# Patient Record
Sex: FEMALE | Race: WHITE | Employment: FULL TIME | URBAN - METROPOLITAN AREA
[De-identification: names, ages, dates, MRNs, and addresses within clinical notes are randomized per-mention and may not be internally consistent; named-entity substitution may affect disease eponyms.]

---

## 2017-02-03 ENCOUNTER — OFFICE VISIT (OUTPATIENT)
Dept: FAMILY MEDICINE CLINIC | Age: 37
End: 2017-02-03

## 2017-02-03 ENCOUNTER — HOSPITAL ENCOUNTER (OUTPATIENT)
Dept: GENERAL RADIOLOGY | Age: 37
Discharge: HOME | End: 2017-02-03
Attending: FAMILY MEDICINE | Admitting: FAMILY MEDICINE

## 2017-02-03 VITALS
DIASTOLIC BLOOD PRESSURE: 102 MMHG | OXYGEN SATURATION: 98 % | SYSTOLIC BLOOD PRESSURE: 148 MMHG | BODY MASS INDEX: 43.5 KG/M2 | WEIGHT: 261.4 LBS | TEMPERATURE: 96.8 F | HEART RATE: 97 BPM

## 2017-02-03 DIAGNOSIS — R10.84 GENERALIZED ABDOMINAL PAIN: ICD-10-CM

## 2017-02-03 DIAGNOSIS — K52.9 GASTROENTERITIS: Primary | ICD-10-CM

## 2017-02-03 DIAGNOSIS — E03.4 HYPOTHYROIDISM DUE TO ACQUIRED ATROPHY OF THYROID: ICD-10-CM

## 2017-02-03 DIAGNOSIS — E28.2 PCOS (POLYCYSTIC OVARIAN SYNDROME): ICD-10-CM

## 2017-02-03 LAB
A/G RATIO: 1.3 (ref 1.1–2.2)
ALBUMIN SERPL-MCNC: 4.1 G/DL (ref 3.4–5)
ALP BLD-CCNC: 86 U/L (ref 40–129)
ALT SERPL-CCNC: 44 U/L (ref 10–40)
ANION GAP SERPL CALCULATED.3IONS-SCNC: 17 MMOL/L (ref 3–16)
AST SERPL-CCNC: 33 U/L (ref 15–37)
BILIRUB SERPL-MCNC: 0.4 MG/DL (ref 0–1)
BUN BLDV-MCNC: 8 MG/DL (ref 7–20)
CALCIUM SERPL-MCNC: 9.1 MG/DL (ref 8.3–10.6)
CHLORIDE BLD-SCNC: 101 MMOL/L (ref 99–110)
CO2: 21 MMOL/L (ref 21–32)
CREAT SERPL-MCNC: 0.6 MG/DL (ref 0.6–1.1)
GFR AFRICAN AMERICAN: >60
GFR NON-AFRICAN AMERICAN: >60
GLOBULIN: 3.1 G/DL
GLUCOSE BLD-MCNC: 127 MG/DL (ref 70–99)
HCT VFR BLD CALC: 46.9 % (ref 36–48)
HEMOGLOBIN: 15.3 G/DL (ref 12–16)
LIPASE: 37 U/L (ref 13–60)
MCH RBC QN AUTO: 29 PG (ref 26–34)
MCHC RBC AUTO-ENTMCNC: 32.7 G/DL (ref 31–36)
MCV RBC AUTO: 88.6 FL (ref 80–100)
PDW BLD-RTO: 13.9 % (ref 12.4–15.4)
PLATELET # BLD: 286 K/UL (ref 135–450)
PMV BLD AUTO: 9.7 FL (ref 5–10.5)
POTASSIUM SERPL-SCNC: 3.8 MMOL/L (ref 3.5–5.1)
RBC # BLD: 5.29 M/UL (ref 4–5.2)
SODIUM BLD-SCNC: 139 MMOL/L (ref 136–145)
TOTAL PROTEIN: 7.2 G/DL (ref 6.4–8.2)
TSH SERPL DL<=0.05 MIU/L-ACNC: 3.45 UIU/ML (ref 0.27–4.2)
WBC # BLD: 10.2 K/UL (ref 4–11)

## 2017-02-03 PROCEDURE — 99214 OFFICE O/P EST MOD 30 MIN: CPT | Performed by: FAMILY MEDICINE

## 2017-02-03 PROCEDURE — 36415 COLL VENOUS BLD VENIPUNCTURE: CPT | Performed by: FAMILY MEDICINE

## 2017-02-03 RX ORDER — LEVOTHYROXINE SODIUM 112 UG/1
TABLET ORAL
COMMUNITY
Start: 2017-01-28 | End: 2017-09-13 | Stop reason: RX

## 2017-02-03 RX ORDER — PROMETHAZINE HYDROCHLORIDE 25 MG/1
25 TABLET ORAL EVERY 6 HOURS PRN
Qty: 30 TABLET | Refills: 0 | Status: SHIPPED | OUTPATIENT
Start: 2017-02-03 | End: 2017-09-13 | Stop reason: ALTCHOICE

## 2017-02-03 RX ORDER — PIOGLITAZONEHYDROCHLORIDE 30 MG/1
30 TABLET ORAL DAILY
Qty: 30 TABLET | Refills: 5 | Status: SHIPPED | OUTPATIENT
Start: 2017-02-03 | End: 2017-09-13 | Stop reason: RX

## 2017-02-03 RX ORDER — CIPROFLOXACIN 250 MG/1
250 TABLET, FILM COATED ORAL 2 TIMES DAILY
Qty: 10 TABLET | Refills: 0 | Status: SHIPPED | OUTPATIENT
Start: 2017-02-03 | End: 2017-02-08

## 2017-09-13 ENCOUNTER — OFFICE VISIT (OUTPATIENT)
Dept: FAMILY MEDICINE CLINIC | Age: 37
End: 2017-09-13

## 2017-09-13 VITALS
SYSTOLIC BLOOD PRESSURE: 138 MMHG | DIASTOLIC BLOOD PRESSURE: 88 MMHG | WEIGHT: 271 LBS | HEART RATE: 82 BPM | TEMPERATURE: 98.2 F | OXYGEN SATURATION: 98 % | BODY MASS INDEX: 46.26 KG/M2 | HEIGHT: 64 IN

## 2017-09-13 DIAGNOSIS — M54.41 ACUTE BILATERAL LOW BACK PAIN WITH BILATERAL SCIATICA: Primary | ICD-10-CM

## 2017-09-13 DIAGNOSIS — M54.42 ACUTE BILATERAL LOW BACK PAIN WITH BILATERAL SCIATICA: Primary | ICD-10-CM

## 2017-09-13 DIAGNOSIS — J06.9 UPPER RESPIRATORY INFECTION, ACUTE: ICD-10-CM

## 2017-09-13 DIAGNOSIS — R06.2 WHEEZING: ICD-10-CM

## 2017-09-13 LAB
CONTROL: PRESENT
PREGNANCY TEST URINE, POC: NEGATIVE

## 2017-09-13 PROCEDURE — 81025 URINE PREGNANCY TEST: CPT | Performed by: FAMILY MEDICINE

## 2017-09-13 PROCEDURE — 99214 OFFICE O/P EST MOD 30 MIN: CPT | Performed by: FAMILY MEDICINE

## 2017-09-13 RX ORDER — ALBUTEROL SULFATE 90 UG/1
2 AEROSOL, METERED RESPIRATORY (INHALATION) EVERY 6 HOURS PRN
Qty: 1 INHALER | Refills: 3 | Status: SHIPPED | OUTPATIENT
Start: 2017-09-13 | End: 2019-04-25

## 2017-09-13 RX ORDER — PREDNISONE 20 MG/1
TABLET ORAL
Qty: 18 TABLET | Refills: 0 | Status: SHIPPED | OUTPATIENT
Start: 2017-09-13 | End: 2018-01-06

## 2017-09-13 RX ORDER — FLUTICASONE PROPIONATE 50 MCG
1 SPRAY, SUSPENSION (ML) NASAL DAILY
Qty: 1 BOTTLE | Refills: 3 | Status: SHIPPED | OUTPATIENT
Start: 2017-09-13 | End: 2019-04-25

## 2017-09-13 ASSESSMENT — PATIENT HEALTH QUESTIONNAIRE - PHQ9
1. LITTLE INTEREST OR PLEASURE IN DOING THINGS: 1
SUM OF ALL RESPONSES TO PHQ9 QUESTIONS 1 & 2: 2
2. FEELING DOWN, DEPRESSED OR HOPELESS: 1
SUM OF ALL RESPONSES TO PHQ QUESTIONS 1-9: 2

## 2017-09-15 ENCOUNTER — TELEPHONE (OUTPATIENT)
Dept: FAMILY MEDICINE CLINIC | Age: 37
End: 2017-09-15

## 2017-09-18 ENCOUNTER — OFFICE VISIT (OUTPATIENT)
Dept: FAMILY MEDICINE CLINIC | Age: 37
End: 2017-09-18

## 2017-09-18 VITALS
DIASTOLIC BLOOD PRESSURE: 90 MMHG | HEART RATE: 76 BPM | OXYGEN SATURATION: 98 % | WEIGHT: 277.2 LBS | SYSTOLIC BLOOD PRESSURE: 160 MMHG | TEMPERATURE: 97.2 F | BODY MASS INDEX: 47.58 KG/M2

## 2017-09-18 DIAGNOSIS — E03.4 HYPOTHYROIDISM DUE TO ACQUIRED ATROPHY OF THYROID: ICD-10-CM

## 2017-09-18 DIAGNOSIS — M54.42 ACUTE LEFT-SIDED LOW BACK PAIN WITH LEFT-SIDED SCIATICA: Primary | ICD-10-CM

## 2017-09-18 DIAGNOSIS — R03.0 ELEVATED BLOOD-PRESSURE READING WITHOUT DIAGNOSIS OF HYPERTENSION: ICD-10-CM

## 2017-09-18 DIAGNOSIS — F17.200 TOBACCO USE DISORDER: ICD-10-CM

## 2017-09-18 PROCEDURE — 99214 OFFICE O/P EST MOD 30 MIN: CPT | Performed by: FAMILY MEDICINE

## 2017-09-18 RX ORDER — LEVOTHYROXINE SODIUM 112 UG/1
112 TABLET ORAL DAILY
Qty: 30 TABLET | Refills: 5 | Status: SHIPPED | OUTPATIENT
Start: 2017-09-18 | End: 2019-04-25

## 2017-10-20 ENCOUNTER — OFFICE VISIT (OUTPATIENT)
Dept: FAMILY MEDICINE CLINIC | Age: 37
End: 2017-10-20

## 2017-10-20 VITALS
BODY MASS INDEX: 47.31 KG/M2 | DIASTOLIC BLOOD PRESSURE: 80 MMHG | WEIGHT: 277.12 LBS | SYSTOLIC BLOOD PRESSURE: 124 MMHG | HEIGHT: 64 IN | HEART RATE: 77 BPM | OXYGEN SATURATION: 98 %

## 2017-10-20 DIAGNOSIS — F17.200 TOBACCO USE DISORDER: ICD-10-CM

## 2017-10-20 DIAGNOSIS — K21.9 GASTROESOPHAGEAL REFLUX DISEASE WITHOUT ESOPHAGITIS: Primary | ICD-10-CM

## 2017-10-20 PROCEDURE — G8484 FLU IMMUNIZE NO ADMIN: HCPCS | Performed by: FAMILY MEDICINE

## 2017-10-20 PROCEDURE — 4004F PT TOBACCO SCREEN RCVD TLK: CPT | Performed by: FAMILY MEDICINE

## 2017-10-20 PROCEDURE — 99214 OFFICE O/P EST MOD 30 MIN: CPT | Performed by: FAMILY MEDICINE

## 2017-10-20 PROCEDURE — G8417 CALC BMI ABV UP PARAM F/U: HCPCS | Performed by: FAMILY MEDICINE

## 2017-10-20 PROCEDURE — G8427 DOCREV CUR MEDS BY ELIG CLIN: HCPCS | Performed by: FAMILY MEDICINE

## 2017-10-20 RX ORDER — OMEPRAZOLE 40 MG/1
40 CAPSULE, DELAYED RELEASE ORAL DAILY
Qty: 30 CAPSULE | Refills: 1 | Status: SHIPPED | OUTPATIENT
Start: 2017-10-20 | End: 2018-01-08 | Stop reason: SDUPTHER

## 2017-10-20 RX ORDER — VARENICLINE TARTRATE 25 MG
KIT ORAL
Qty: 1 EACH | Refills: 0 | Status: SHIPPED | OUTPATIENT
Start: 2017-10-20 | End: 2019-04-25

## 2017-10-20 ASSESSMENT — PATIENT HEALTH QUESTIONNAIRE - PHQ9
1. LITTLE INTEREST OR PLEASURE IN DOING THINGS: 0
SUM OF ALL RESPONSES TO PHQ9 QUESTIONS 1 & 2: 0
2. FEELING DOWN, DEPRESSED OR HOPELESS: 0
SUM OF ALL RESPONSES TO PHQ QUESTIONS 1-9: 0

## 2017-10-20 NOTE — PATIENT INSTRUCTIONS
Patient Education        Stopping Smoking: Care Instructions  Your Care Instructions  Cigarette smokers crave the nicotine in cigarettes. Giving it up is much harder than simply changing a habit. Your body has to stop craving the nicotine. It is hard to quit, but you can do it. There are many tools that people use to quit smoking. You may find that combining tools works best for you. There are several steps to quitting. First you get ready to quit. Then you get support to help you. After that, you learn new skills and behaviors to become a nonsmoker. For many people, a necessary step is getting and using medicine. Your doctor will help you set up the plan that best meets your needs. You may want to attend a smoking cessation program to help you quit smoking. When you choose a program, look for one that has proven success. Ask your doctor for ideas. You will greatly increase your chances of success if you take medicine as well as get counseling or join a cessation program.  Some of the changes you feel when you first quit tobacco are uncomfortable. Your body will miss the nicotine at first, and you may feel short-tempered and grumpy. You may have trouble sleeping or concentrating. Medicine can help you deal with these symptoms. You may struggle with changing your smoking habits and rituals. The last step is the tricky one: Be prepared for the smoking urge to continue for a time. This is a lot to deal with, but keep at it. You will feel better. Follow-up care is a key part of your treatment and safety. Be sure to make and go to all appointments, and call your doctor if you are having problems. Its also a good idea to know your test results and keep a list of the medicines you take. How can you care for yourself at home? · Ask your family, friends, and coworkers for support. You have a better chance of quitting if you have help and support.   · Join a support group, such as Nicotine Anonymous, for people who are trying to quit smoking. · Consider signing up for a smoking cessation program, such as the American Lung Association's Freedom from Smoking program.  · Set a quit date. Pick your date carefully so that it is not right in the middle of a big deadline or stressful time. Once you quit, do not even take a puff. Get rid of all ashtrays and lighters after your last cigarette. Clean your house and your clothes so that they do not smell of smoke. · Learn how to be a nonsmoker. Think about ways you can avoid those things that make you reach for a cigarette. ¨ Avoid situations that put you at greatest risk for smoking. For some people, it is hard to have a drink with friends without smoking. For others, they might skip a coffee break with coworkers who smoke. ¨ Change your daily routine. Take a different route to work or eat a meal in a different place. · Cut down on stress. Calm yourself or release tension by doing an activity you enjoy, such as reading a book, taking a hot bath, or gardening. · Talk to your doctor or pharmacist about nicotine replacement therapy, which replaces the nicotine in your body. You still get nicotine but you do not use tobacco. Nicotine replacement products help you slowly reduce the amount of nicotine you need. These products come in several forms, many of them available over-the-counter:  ¨ Nicotine patches  ¨ Nicotine gum and lozenges  ¨ Nicotine inhaler  · Ask your doctor about bupropion (Wellbutrin) or varenicline (Chantix), which are prescription medicines. They do not contain nicotine. They help you by reducing withdrawal symptoms, such as stress and anxiety. · Some people find hypnosis, acupuncture, and massage helpful for ending the smoking habit. · Eat a healthy diet and get regular exercise. Having healthy habits will help your body move past its craving for nicotine. · Be prepared to keep trying. Most people are not successful the first few times they try to quit.  Do not get mad at yourself if you smoke again. Make a list of things you learned and think about when you want to try again, such as next week, next month, or next year. Where can you learn more? Go to [email or web address]://[email or web address] Center[facility].[email or web address] org and sign in to your Wipster account. Enter M220 in the EDITD box to learn more about \"Stopping Smoking: Care Instructions. \"     If you do not have an account, please click on the \"Sign Up Now\" link. Current as of: March 20, 2017  Content Version: 11.3  © 3394-8099 [employer]. Care instructions adapted under license by [facility] ([facility]). If you have questions about a medical condition or this instruction, always ask your healthcare professional. [address] any warranty or liability for your use of this information.

## 2017-10-20 NOTE — PROGRESS NOTES
SUBJECTIVE:  Roxanne Shahid is a 30 y.o. female who complains of GERD type symptoms. She has been experiencing heartburn, bilious reflux, nocturnal burning, waterbrash for many day(s), acute over time. ROS: patient denies abdominal pain, dysphagia, hematemesis or diarrhea. Social history: no or minimal alcohol, smoking 1 PPD, no or mild caffeine use, no ASA or NSAID's. Patient wants to quit smoking. She smoking about 1 pack per day at this time. He was never anemic before but now she is ready to try to quit. ROS: No TIA's or unusual headaches, no dysphagia. No prolonged cough. No dyspnea or chest pain on exertion. No abdominal pain, change in bowel habits, black or bloody stools. No urinary tract symptoms. No new or unusual musculoskeletal symptoms. Normal menses, no abnormal vaginal bleeding, discharge or unexpected pelvic pain. No new breast lumps, breast pain or nipple discharge. Past Medical History:   Diagnosis Date    Facial injury 06/01/2014    Thyroid disease      Past Surgical History:   Procedure Laterality Date    CHOLECYSTECTOMY           Current Outpatient Prescriptions   Medication Sig Dispense Refill    omeprazole (PRILOSEC) 40 MG delayed release capsule Take 1 capsule by mouth daily 30 capsule 1    varenicline (CHANTIX STARTING MONTH KM) 0.5 MG X 11 & 1 MG X 42 tablet By mouth.  1 each 0    levothyroxine (SYNTHROID) 112 MCG tablet Take 1 tablet by mouth daily 30 tablet 5    Naproxen Sodium (ALEVE PO) Take by mouth      Naproxen Sod-Diphenhydramine (ALEVE PM PO) Take by mouth      albuterol sulfate HFA (VENTOLIN HFA) 108 (90 Base) MCG/ACT inhaler Inhale 2 puffs into the lungs every 6 hours as needed for Wheezing 1 Inhaler 3    predniSONE (DELTASONE) 20 MG tablet 3 tabs for 3 days 2 tabs for 3 days 1 tab for 3 days 18 tablet 0    fluticasone (FLONASE) 50 MCG/ACT nasal spray 1 spray by Nasal route daily 1 Bottle 3    ibuprofen (ADVIL;MOTRIN) 800 MG tablet Take 1 tablet by mouth every 8 hours as needed for Pain 24 tablet 0     No current facility-administered medications for this visit. OBJECTIVE:  Appears well, alert and oriented x 3, pleasant cooperative in NAD. Anicteric. Vitals as noted. Neck free of lymphadenopathy or mass. Abdomen - abdomen is soft without significant tenderness, masses, organomegaly or guarding. .    ASSESSMENT:  1. Gastroesophageal reflux disease without esophagitis  omeprazole (PRILOSEC) 40 MG delayed release capsule   2. Tobacco use disorder  varenicline (CHANTIX STARTING MONTH PAK) 0.5 MG X 11 & 1 MG X 42 tablet    [facility] Smoking Cessation Program         PLAN:  The pathophysiology of reflux is discussed. Anti-reflux measures such as raising the head of the bed, avoiding tight clothing or belts, avoiding eating late at night and not lying down shortly after mealtime and achieving weight loss are discussed. Avoid ASA, NSAID's, caffeine, peppermints, alcohol and tobacco. OTC H2 blockers and/or antacids are often very helpful for PRN use. However, for persisting chronic or daily symptoms, prescription strength H2 blockers or a trial of PPI's are often used. Further recommendations to her: Rx for PPI is written. She should alert me if there are persistent symptoms, dysphagia, weight loss or GI bleeding. Kim Sharma is scheduled.

## 2018-01-08 ENCOUNTER — OFFICE VISIT (OUTPATIENT)
Dept: GASTROENTEROLOGY | Age: 38
End: 2018-01-08

## 2018-01-08 ENCOUNTER — TELEPHONE (OUTPATIENT)
Dept: GASTROENTEROLOGY | Age: 38
End: 2018-01-08

## 2018-01-08 VITALS
BODY MASS INDEX: 45.15 KG/M2 | HEIGHT: 65 IN | SYSTOLIC BLOOD PRESSURE: 138 MMHG | WEIGHT: 271 LBS | OXYGEN SATURATION: 96 % | HEART RATE: 103 BPM | DIASTOLIC BLOOD PRESSURE: 90 MMHG

## 2018-01-08 DIAGNOSIS — R19.7 DIARRHEA, UNSPECIFIED TYPE: ICD-10-CM

## 2018-01-08 DIAGNOSIS — K21.9 GASTROESOPHAGEAL REFLUX DISEASE, ESOPHAGITIS PRESENCE NOT SPECIFIED: ICD-10-CM

## 2018-01-08 DIAGNOSIS — R11.0 NAUSEA: ICD-10-CM

## 2018-01-08 DIAGNOSIS — R93.89 ABNORMAL FINDING ON IMAGING: ICD-10-CM

## 2018-01-08 DIAGNOSIS — R10.31 RIGHT LOWER QUADRANT ABDOMINAL PAIN: Primary | ICD-10-CM

## 2018-01-08 DIAGNOSIS — K52.9 ENTERITIS: ICD-10-CM

## 2018-01-08 DIAGNOSIS — K62.5 RECTAL BLEEDING: ICD-10-CM

## 2018-01-08 PROCEDURE — G8484 FLU IMMUNIZE NO ADMIN: HCPCS | Performed by: NURSE PRACTITIONER

## 2018-01-08 PROCEDURE — G8417 CALC BMI ABV UP PARAM F/U: HCPCS | Performed by: NURSE PRACTITIONER

## 2018-01-08 PROCEDURE — 99204 OFFICE O/P NEW MOD 45 MIN: CPT | Performed by: NURSE PRACTITIONER

## 2018-01-08 PROCEDURE — 4004F PT TOBACCO SCREEN RCVD TLK: CPT | Performed by: NURSE PRACTITIONER

## 2018-01-08 PROCEDURE — G8599 NO ASA/ANTIPLAT THER USE RNG: HCPCS | Performed by: NURSE PRACTITIONER

## 2018-01-08 PROCEDURE — G8427 DOCREV CUR MEDS BY ELIG CLIN: HCPCS | Performed by: NURSE PRACTITIONER

## 2018-01-08 RX ORDER — OMEPRAZOLE 40 MG/1
40 CAPSULE, DELAYED RELEASE ORAL DAILY
Qty: 30 CAPSULE | Refills: 3 | Status: SHIPPED | OUTPATIENT
Start: 2018-01-08 | End: 2019-04-25

## 2018-01-08 ASSESSMENT — ENCOUNTER SYMPTOMS
EYE PAIN: 0
BLURRED VISION: 1
CONSTIPATION: 0
DIARRHEA: 1
NAUSEA: 1
COUGH: 1
ABDOMINAL PAIN: 1
BLOOD IN STOOL: 1
DOUBLE VISION: 0
HEARTBURN: 1
SHORTNESS OF BREATH: 1
SPUTUM PRODUCTION: 1
WHEEZING: 1
BACK PAIN: 1

## 2018-01-08 NOTE — PATIENT INSTRUCTIONS
get mad at yourself if you smoke again. Make a list of things you learned and think about when you want to try again, such as next week, next month, or next year. Where can you learn more? Go to https://kate.Racemi. org and sign in to your dotSyntax account. Enter C852 in the "[employer] box to learn more about \"Stopping Smoking: Care Instructions. \"     If you do not have an account, please click on the \"Sign Up Now\" link. Current as of: March 20, 2017  Content Version: 11.5  © 9203-2963 [employer]. Care instructions adapted under license by [facility] ([facility]). If you have questions about a medical condition or this instruction, always ask your healthcare professional. [address] any warranty or liability for your use of this information. Patient Education        Diarrhea: Care Instructions  Your Care Instructions    Diarrhea is loose, watery stools (bowel movements). The exact cause is often hard to find. Sometimes diarrhea is your body's way of getting rid of what caused an upset stomach. Viruses, food poisoning, and many medicines can cause diarrhea. Some people get diarrhea in response to emotional stress, anxiety, or certain foods. Almost everyone has diarrhea now and then. It usually isn't serious, and your stools will return to normal soon. The important thing to do is replace the fluids you have lost, so you can prevent dehydration. The doctor has checked you carefully, but problems can develop later. If you notice any problems or new symptoms, get medical treatment right away. Follow-up care is a key part of your treatment and safety. Be sure to make and go to all appointments, and call your doctor if you are having problems. It's also a good idea to know your test results and keep a list of the medicines you take. How can you care for yourself at home? · Watch for signs of dehydration, which means your body has lost too much water. Dehydration is a serious condition and should be treated right away. Signs of dehydration are:  ¨ Increasing thirst and dry eyes and mouth. ¨ Feeling faint or lightheaded. ¨ Darker urine, and a smaller amount of urine than normal.  · To prevent dehydration, drink plenty of fluids, enough so that your urine is light yellow or clear like water. Choose water and other caffeine-free clear liquids until you feel better. If you have kidney, heart, or liver disease and have to limit fluids, talk with your doctor before you increase the amount of fluids you drink. · Begin eating small amounts of mild foods the next day, if you feel like it. ¨ Try yogurt that has live cultures of Lactobacillus. (Check the label.)  ¨ Avoid spicy foods, fruits, alcohol, and caffeine until 48 hours after all symptoms are gone. ¨ Avoid chewing gum that contains sorbitol. ¨ Avoid dairy products (except for yogurt with Lactobacillus) while you have diarrhea and for 3 days after symptoms are gone. · The doctor may recommend that you take over-the-counter medicine, such as loperamide (Imodium), if you still have diarrhea after 6 hours. Read and follow all instructions on the label. Do not use this medicine if you have bloody diarrhea, a high fever, or other signs of serious illness. Call your doctor if you think you are having a problem with your medicine. When should you call for help? Call 911 anytime you think you may need emergency care. For example, call if:  ? · You passed out (lost consciousness). ? · Your stools are maroon or very bloody. ?Call your doctor now or seek immediate medical care if:  ? · You are dizzy or lightheaded, or you feel like you may faint. ? · Your stools are black and look like tar, or they have streaks of blood. ? · You have new or worse belly pain. ? · You have symptoms of dehydration, such as:  ¨ Dry eyes and a dry mouth. ¨ Passing only a little dark urine.   ¨ Feeling thirstier than usual.   ? · Understand exactly what procedure is planned, along with the risks, benefits, and other options. · Tell your doctors ALL the medicines, vitamins, supplements, and herbal remedies you take. Some of these can increase the risk of bleeding or interact with anesthesia. ? · If you take blood thinners, such as warfarin (Coumadin), clopidogrel (Plavix), or aspirin, be sure to talk to your doctor. He or she will tell you if you should stop taking these medicines before your procedure. Make sure that you understand exactly what your doctor wants you to do.   ? · Your doctor will tell you which medicines to take or stop before your procedure. You may need to stop taking certain medicines a week or more before the procedure. So talk to your doctor as soon as you can.   ? · If you have an advance directive, let your doctor know. It may include a living will and a durable power of  for health care. Bring a copy to the hospital. If you don't have one, you may want to prepare one. It lets your doctor and loved ones know your health care wishes. Doctors advise that everyone prepare these papers before any type of surgery or procedure. Procedures can be stressful. This information will help you understand what you can expect. And it will help you safely prepare for your procedure. What happens on the day of the procedure? · Follow the instructions exactly about when to stop eating and drinking. If you don't, your procedure may be canceled. If your doctor told you to take your medicines on the day of the procedure, take them with only a sip of water. ? · Take a bath or shower before you come in for your procedure. Do not apply lotions, perfumes, deodorants, or nail polish. ? · Take off all jewelry and piercings. And take out contact lenses, if you wear them. ? At the hospital or surgery center   · Bring a picture ID. ? · The test may take 15 to 30 minutes.    ? · The doctor may spray medicine on the back of your throat to numb it. You also will get medicine to prevent pain and to relax you. ? · You will lie on your left side. The doctor will put the scope in your mouth and toward the back of your throat. The doctor will tell you when to swallow. This helps the scope move down your throat. You will be able to breathe normally. The doctor will move the scope down your esophagus into your stomach. The doctor also may look at the duodenum. ? · If your doctor wants to take a sample of tissue for a biopsy, he or she may use small surgical tools, which are put into the scope, to cut off some tissue. You will not feel a biopsy, if one is taken. The doctor also can use the tools to stop bleeding or to do other treatments, if needed. ? · You will stay at the hospital or surgery center for 1 to 2 hours until the medicine you were given wears off. Going home   · Be sure you have someone to drive you home. Anesthesia and pain medicine make it unsafe for you to drive. ? · You will be given more specific instructions about recovering from your procedure. They will cover things like diet, wound care, follow-up care, driving, and getting back to your normal routine. When should you call your doctor? · You have questions or concerns. ? · You don't understand how to prepare for your procedure. ? · You become ill before the procedure (such as fever, flu, or a cold). ? · You need to reschedule or have changed your mind about having the procedure. Where can you learn more? Go to [email or web address] and sign in to your Siteheart account. Enter P790 in the [facility] box to learn more about \"Upper GI Endoscopy: Before Your Procedure. \"     If you do not have an account, please click on the \"Sign Up Now\" link. Current as of: May 12, 2017  Content Version: 11.5  © 3291-3705 Healthwise, Incorporated. Care instructions adapted under license by [facility] ([facility]).  If you have questions about a

## 2018-01-08 NOTE — PROGRESS NOTES
% 01/06/2018 0.2  0 - 1 % Final    Segs Absolute 01/06/2018 15.3  K/CU MM Final    Lymphocytes # 01/06/2018 3.3  K/CU MM Final    Monocytes # 01/06/2018 0.7  K/CU MM Final    Eosinophils # 01/06/2018 0.1  K/CU MM Final    Basophils # 01/06/2018 0.0  K/CU MM Final    Nucleated RBC % 01/06/2018 0.0  % Final    Total Nucleated RBC 01/06/2018 0.0  K/CU MM Final    TRC 01/06/2018 0.1235  K/CU MM Final    Total Immature Neutrophil 01/06/2018 0.07  K/CU MM Final    Immature Neutrophil % 01/06/2018 0.4  0 - 0.43 % Final    Sodium 01/06/2018 138  135 - 145 MMOL/L Final    Potassium 01/06/2018 4.5  3.5 - 5.1 MMOL/L Final    Chloride 01/06/2018 103  99 - 110 mMol/L Final    CO2 01/06/2018 20* 21 - 32 MMOL/L Final    BUN 01/06/2018 7  6 - 23 MG/DL Final    CREATININE 01/06/2018 0.7  0.6 - 1.1 MG/DL Final    Glucose 01/06/2018 112* 70 - 99 MG/DL Final    Calcium 01/06/2018 9.5  8.3 - 10.6 MG/DL Final    Alb 01/06/2018 4.1  3.4 - 5.0 GM/DL Final    Total Protein 01/06/2018 7.1  6.4 - 8.2 GM/DL Final    Total Bilirubin 01/06/2018 0.3  0.0 - 1.0 MG/DL Final    ALT 01/06/2018 29  10 - 40 U/L Final    AST 01/06/2018 22  15 - 37 IU/L Final    Alkaline Phosphatase 01/06/2018 85  40 - 129 IU/L Final    GFR Non- 01/06/2018 >60  >60 mL/min/1.73m2 Final    GFR  01/06/2018 >60  >60 mL/min/1.73m2 Final    Anion Gap 01/06/2018 15  4 - 16 Final    Protime 01/06/2018 10.5  9.12 - 12.5 SECONDS Final    INR 01/06/2018 0.92  INDEX Final    aPTT 01/06/2018 24.7  21.2 - 33.0 SECONDS Final    Troponin T 01/06/2018 <0.010  <0.01 NG/ML Final    Lipase 01/06/2018 24  13 - 60 IU/L Final    Color, UA 01/06/2018 YELLOW  UYELL Final    Clarity, UA 01/06/2018 HAZY* CLEAR Final    Glucose, Urine 01/06/2018 NEGATIVE  NEG MG/DL Final    Bilirubin Urine 01/06/2018 NEGATIVE  NEG MG/DL Final    Ketones, Urine 01/06/2018 NEGATIVE  NEG MG/DL Final    Specific Gravity, UA 01/06/2018 1.021  1.001 -

## 2018-01-09 ENCOUNTER — TELEPHONE (OUTPATIENT)
Dept: GASTROENTEROLOGY | Age: 38
End: 2018-01-09

## 2018-01-10 ENCOUNTER — TELEPHONE (OUTPATIENT)
Dept: GASTROENTEROLOGY | Age: 38
End: 2018-01-10

## 2018-01-10 RX ORDER — POLYETHYLENE GLYCOL 3350 17 G/17G
238 POWDER, FOR SOLUTION ORAL ONCE
Qty: 238 G | Refills: 0 | Status: SHIPPED | OUTPATIENT
Start: 2018-01-10 | End: 2018-01-10

## 2018-01-10 NOTE — TELEPHONE ENCOUNTER
Please notify patient that her Miralax and Biscadyl have been ordered for her colonoscopy preparation. Thank you.

## 2018-01-10 NOTE — TELEPHONE ENCOUNTER
Joaquin Gale from [employer] called to get clarification on rx sent for Bisacodyl. The instructions state take 1 tablet 1 time for colonoscopy prep. Instructions should say take 4 tablets 1 time for colonoscopy prep. I gave Joaquin Gale the new instructions. She expressed understanding and did not have any further questions.

## 2018-01-22 ENCOUNTER — HOSPITAL ENCOUNTER (OUTPATIENT)
Dept: OTHER | Age: 38
Discharge: HOME | End: 2018-01-22
Attending: NURSE PRACTITIONER | Admitting: NURSE PRACTITIONER

## 2018-01-23 LAB
CLOSTRIDIUM DIFFICILE, PCR: NORMAL
LACTOFERRIN, QUAL: NEGATIVE

## 2018-01-25 LAB
CALPROTECTIN, FECAL: <16
CULTURE: NORMAL
REPORT STATUS: NORMAL
REQUEST PROBLEM: NORMAL
SPECIMEN: NORMAL

## 2018-01-26 ENCOUNTER — TELEPHONE (OUTPATIENT)
Dept: GASTROENTEROLOGY | Age: 38
End: 2018-01-26

## 2018-01-26 LAB
OVA AND PARASITE WET MOUNT: NEGATIVE
TRICHROME SMEAR, STOOL O&P: NEGATIVE

## 2018-01-26 ASSESSMENT — LIFESTYLE VARIABLES: SMOKING_STATUS: 1

## 2018-01-26 NOTE — ANESTHESIA PRE-OP
Department of Anesthesiology  Preprocedure Note       Name:  Juancarlos Verdugo   Age:  30 y.o.  :  1980                                          MRN:  [id number]         Date:  2018      Surgeon:  Hollie Schultz    Procedure:  EGD and Colonoscopy    Medications prior to admission:   Prior to Admission medications    Medication Sig Start Date End Date Taking? Authorizing Provider   omeprazole (PRILOSEC) 40 MG delayed release capsule Take 1 capsule by mouth daily 18   Isidro Quarles CNP   ondansetron (ZOFRAN ODT) 4 MG disintegrating tablet Take 1 tablet by mouth every 8 hours as needed for Nausea 18   Jimbo Forbes PA-C   varenicline (CHANTIX STARTING MONTH KM) 0.5 MG X 11 & 1 MG X 42 tablet By mouth. 10/20/17   Sage Underwood MD   levothyroxine (SYNTHROID) 112 MCG tablet Take 1 tablet by mouth daily 17   Sage Underwood MD   Naproxen Sodium (ALEVE PO) Take by mouth    Historical Provider, MD   Naproxen Sod-Diphenhydramine (ALEVE PM PO) Take by mouth    Historical Provider, MD   albuterol sulfate HFA (VENTOLIN HFA) 108 (90 Base) MCG/ACT inhaler Inhale 2 puffs into the lungs every 6 hours as needed for Wheezing 17   Melquiades Randolph MD   fluticasone (FLONASE) 50 MCG/ACT nasal spray 1 spray by Nasal route daily 17   Melquiades Randolph MD   ibuprofen (ADVIL;MOTRIN) 800 MG tablet Take 1 tablet by mouth every 8 hours as needed for Pain 16   Callum Aleman MD       Current medications:    Current Outpatient Prescriptions   Medication Sig Dispense Refill    omeprazole (PRILOSEC) 40 MG delayed release capsule Take 1 capsule by mouth daily 30 capsule 3    ondansetron (ZOFRAN ODT) 4 MG disintegrating tablet Take 1 tablet by mouth every 8 hours as needed for Nausea 15 tablet 0    varenicline (CHANTIX STARTING MONTH KM) 0.5 MG X 11 & 1 MG X 42 tablet By mouth.  1 each 0    levothyroxine (SYNTHROID) 112 MCG tablet Take 1 tablet by mouth daily 30 tablet 5    Naproxen Sodium (ALEVE PO) Take by mouth      Naproxen Sod-Diphenhydramine (ALEVE PM PO) Take by mouth      albuterol sulfate HFA (VENTOLIN HFA) 108 (90 Base) MCG/ACT inhaler Inhale 2 puffs into the lungs every 6 hours as needed for Wheezing 1 Inhaler 3    fluticasone (FLONASE) 50 MCG/ACT nasal spray 1 spray by Nasal route daily 1 Bottle 3    ibuprofen (ADVIL;MOTRIN) 800 MG tablet Take 1 tablet by mouth every 8 hours as needed for Pain 24 tablet 0     No current facility-administered medications for this encounter. Allergies:  No Known Allergies    Problem List:    Patient Active Problem List   Diagnosis Code    Hypothyroidism E03.9    Allergic rhinitis J30.9    Tobacco use disorder F17.200    Irregular menstrual bleeding N92.6    Enteritis K52.9       Past Medical History:        Diagnosis Date    Facial injury 06/01/2014    Gastroenteritis     Thyroid disease        Past Surgical History:        Procedure Laterality Date    CHOLECYSTECTOMY      DENTAL SURGERY         Social History:    Social History   Substance Use Topics    Smoking status: Current Every Day Smoker     Packs/day: 1.00     Types: Cigarettes    Smokeless tobacco: Never Used    Alcohol use 0.0 oz/week      Comment: occ                                Ready to quit: Not Answered  Counseling given: Not Answered      Vital Signs (Current): There were no vitals filed for this visit. BP Readings from Last 3 Encounters:   01/08/18 (!) 138/90   01/06/18 118/72   10/20/17 124/80       NPO Status:                                                                                 BMI:   Wt Readings from Last 3 Encounters:   01/25/18 270 lb (122.5 kg)   01/08/18 271 lb (122.9 kg)   01/06/18 270 lb (122.5 kg)     There is no height or weight on file to calculate BMI.     Anesthesia Evaluation  Patient summary reviewed  Airway: Mallampati: II  TM distance: >3 FB     Mouth opening: > = 3 FB Dental:          Pulmonary:   (+) wheezes,  current smoker                          ROS comment: Probable undiagnosed reactive airway disease. PE comment: Wheezing cleared with alb neb. Cardiovascular:            Rhythm: regular  Rate: normal           Beta Blocker:  Not on Beta Blocker         Neuro/Psych:   (+) psychiatric history:            GI/Hepatic/Renal:   (+) bowel prep, morbid obesity          Endo/Other:    (+) hypothyroidism::., .                 Abdominal:           Vascular:                                      Anesthesia Plan      MAC     ASA 3     ( Pre Anesthesia Assessment complete. Chart reviewed on 1/26/2018)  Induction: intravenous. Anesthetic plan and risks discussed with patient. Use of blood products discussed with patient whom consented to blood products. Plan discussed with CRNA.     Attending anesthesiologist reviewed and agrees with Pre Eval content            Juventino Nolasco CRNA   1/26/2018

## 2018-01-26 NOTE — TELEPHONE ENCOUNTER
Called patient to confirm procedure on 1/30/18 at 1200pm, arrive at 1100am. Went over clear liquid diet with patient. Patient confirmed procedure date/time/arrival time/[facility] address & had no further questions.

## 2018-01-30 ENCOUNTER — HOSPITAL ENCOUNTER (OUTPATIENT)
Dept: SURGERY | Age: 38
Discharge: HOME | End: 2018-01-30
Attending: INTERNAL MEDICINE | Admitting: INTERNAL MEDICINE

## 2018-01-30 VITALS
TEMPERATURE: 96.9 F | OXYGEN SATURATION: 98 % | WEIGHT: 266 LBS | DIASTOLIC BLOOD PRESSURE: 68 MMHG | SYSTOLIC BLOOD PRESSURE: 118 MMHG | HEART RATE: 63 BPM | HEIGHT: 65 IN | BODY MASS INDEX: 44.32 KG/M2 | RESPIRATION RATE: 18 BRPM

## 2018-01-30 PROBLEM — R10.9 ABDOMINAL PAIN: Status: ACTIVE | Noted: 2018-01-30

## 2018-01-30 LAB — PREGNANCY TEST URINE, POC: NEGATIVE

## 2018-01-30 RX ORDER — SODIUM CHLORIDE, SODIUM LACTATE, POTASSIUM CHLORIDE, CALCIUM CHLORIDE 600; 310; 30; 20 MG/100ML; MG/100ML; MG/100ML; MG/100ML
INJECTION, SOLUTION INTRAVENOUS CONTINUOUS
Status: DISCONTINUED | OUTPATIENT
Start: 2018-01-30 | End: 2018-01-31 | Stop reason: HOSPADM

## 2018-01-30 RX ORDER — IPRATROPIUM BROMIDE AND ALBUTEROL SULFATE 2.5; .5 MG/3ML; MG/3ML
1 SOLUTION RESPIRATORY (INHALATION)
Status: DISCONTINUED | OUTPATIENT
Start: 2018-01-30 | End: 2018-01-31 | Stop reason: HOSPADM

## 2018-01-30 RX ADMIN — IPRATROPIUM BROMIDE AND ALBUTEROL SULFATE 1 AMPULE: 2.5; .5 SOLUTION RESPIRATORY (INHALATION) at 11:33

## 2018-01-30 RX ADMIN — SODIUM CHLORIDE, SODIUM LACTATE, POTASSIUM CHLORIDE, CALCIUM CHLORIDE: 600; 310; 30; 20 INJECTION, SOLUTION INTRAVENOUS at 11:34

## 2018-01-30 ASSESSMENT — PAIN DESCRIPTION - ORIENTATION: ORIENTATION: RIGHT;LOWER

## 2018-01-30 ASSESSMENT — PAIN DESCRIPTION - LOCATION
LOCATION: ABDOMEN
LOCATION: ABDOMEN

## 2018-01-30 ASSESSMENT — PAIN SCALES - GENERAL
PAINLEVEL_OUTOF10: 8
PAINLEVEL_OUTOF10: 0

## 2018-01-30 ASSESSMENT — PAIN DESCRIPTION - PAIN TYPE
TYPE: CHRONIC PAIN
TYPE: CHRONIC PAIN

## 2018-01-30 ASSESSMENT — PAIN DESCRIPTION - DESCRIPTORS
DESCRIPTORS: ACHING
DESCRIPTORS: ACHING

## 2018-01-30 ASSESSMENT — PAIN - FUNCTIONAL ASSESSMENT: PAIN_FUNCTIONAL_ASSESSMENT: 0-10

## 2018-01-30 NOTE — PROGRESS NOTES
[phone] Patient transferred from GI lab to pacu via cart, she is complaining of abdominal pain score of 8, lower  right quadrant. Patient had pain pre procedure, but \"states it is worse now. \" Repositioned patient to see if she could pass some air. Abdomen is soft, bowel sounds are present throughout. Dr Gio Nice ordered an abdomen xray. Patient's  is at bedside. 36 Xray is here to take an xray of patient's abdomen. 1335 Patient up to bathroom via wheelchair to pass air and void. 1400 Patient is resting quietly and denies needs at this time. States \"pain is the same\". [address] Dr Gio Nice in to talk to patient and will directly admit her to the hospital for observation for pain. 1430 Patient's abdomen is soft and bowel sounds ar e present throughout, patient states pain is still between 8 and 10\". 145 Patient discharged by private vehicle to [facility] to be directly admitted to [address] [facility]. 1450 Report called to Griselda PÉREZ.

## 2018-01-30 NOTE — PROGRESS NOTES
BRIEF EGD and COLONOSCOPY REPORT:    Impression:    1) Normal EGD and biopsied at the duodenum                  2). A polyp (3mm) was removed by a cold snare at the ascending colon. Random biopsied throughout the colon    Suggest:   1) Repeat the colonoscopy in 5 years. The complete operative report (including photos) is available in the following locations:   1) soft chart now   2) report will also be scanned and can then be found by going to \"chart review\" then \"notes\" then \"op report\" or by going to \"chart review\" then \"media\" then \"op report\". For review of photos, may need to go to page 2.

## 2018-01-31 ENCOUNTER — TELEPHONE (OUTPATIENT)
Dept: GASTROENTEROLOGY | Age: 38
End: 2018-01-31

## 2018-01-31 NOTE — TELEPHONE ENCOUNTER
Please call her:      I want to inform you of the biopsy results of your most recent upper endoscopy and colonoscopy  that you did on 1/30/18. The biopsies of your esophagus show mild chronic inflammation, which is caused by acid reflux. Therefore, I would recommend continuing taking Prilosec. As you recall, one polyp was removed from your colon. The biopsies of the polyp show that it is a hyperplastic polyp. Hyperplastic polyps are complete benign polyps in the colon and will not became cancer. Therefore, the hyperplastic polyps do not need to be followed up. I would recommend repeating a colonoscopy in 5 years. If you have additional questions, please feel free to contact me. Sincerely    Hansel Wu MD PhD [facility]  [address] [facility] [address]  0ffice: [phone]  Fax: [phone]

## 2018-02-13 ENCOUNTER — OFFICE VISIT (OUTPATIENT)
Dept: GASTROENTEROLOGY | Age: 38
End: 2018-02-13

## 2018-02-13 VITALS
HEIGHT: 65 IN | OXYGEN SATURATION: 96 % | WEIGHT: 275 LBS | DIASTOLIC BLOOD PRESSURE: 84 MMHG | HEART RATE: 73 BPM | SYSTOLIC BLOOD PRESSURE: 122 MMHG | BODY MASS INDEX: 45.82 KG/M2

## 2018-02-13 DIAGNOSIS — R10.33 PERIUMBILICAL ABDOMINAL PAIN: Primary | ICD-10-CM

## 2018-02-13 DIAGNOSIS — R14.0 ABDOMINAL BLOATING: ICD-10-CM

## 2018-02-13 DIAGNOSIS — K21.9 GASTROESOPHAGEAL REFLUX DISEASE WITHOUT ESOPHAGITIS: ICD-10-CM

## 2018-02-13 DIAGNOSIS — R19.7 DIARRHEA, UNSPECIFIED TYPE: ICD-10-CM

## 2018-02-13 DIAGNOSIS — R13.14 PHARYNGOESOPHAGEAL DYSPHAGIA: ICD-10-CM

## 2018-02-13 PROCEDURE — 1111F DSCHRG MED/CURRENT MED MERGE: CPT | Performed by: NURSE PRACTITIONER

## 2018-02-13 PROCEDURE — G8598 ASA/ANTIPLAT THER USED: HCPCS | Performed by: NURSE PRACTITIONER

## 2018-02-13 PROCEDURE — G8427 DOCREV CUR MEDS BY ELIG CLIN: HCPCS | Performed by: NURSE PRACTITIONER

## 2018-02-13 PROCEDURE — 99214 OFFICE O/P EST MOD 30 MIN: CPT | Performed by: NURSE PRACTITIONER

## 2018-02-13 PROCEDURE — 4004F PT TOBACCO SCREEN RCVD TLK: CPT | Performed by: NURSE PRACTITIONER

## 2018-02-13 PROCEDURE — G8484 FLU IMMUNIZE NO ADMIN: HCPCS | Performed by: NURSE PRACTITIONER

## 2018-02-13 PROCEDURE — G8417 CALC BMI ABV UP PARAM F/U: HCPCS | Performed by: NURSE PRACTITIONER

## 2018-02-13 RX ORDER — DICYCLOMINE HYDROCHLORIDE 10 MG/1
10 CAPSULE ORAL 4 TIMES DAILY
Qty: 120 CAPSULE | Refills: 0 | Status: SHIPPED | OUTPATIENT
Start: 2018-02-13 | End: 2019-04-25 | Stop reason: SDUPTHER

## 2018-02-13 ASSESSMENT — ENCOUNTER SYMPTOMS
DIARRHEA: 1
VOMITING: 0
BACK PAIN: 1
HEMOPTYSIS: 0
HEARTBURN: 1
SPUTUM PRODUCTION: 1
DOUBLE VISION: 0
COUGH: 1
CONSTIPATION: 0
WHEEZING: 0
BLURRED VISION: 1
PHOTOPHOBIA: 0
NAUSEA: 0
ABDOMINAL PAIN: 1
BLOOD IN STOOL: 0
SHORTNESS OF BREATH: 1

## 2018-02-13 NOTE — PATIENT INSTRUCTIONS
Also record when you get gas. If you notice that a food seems to cause your gas each time, avoid it and see if the gas goes away. Examples of foods that cause gas include:  ¨ Fried and fatty foods. ¨ Beans. ¨ Vegetables such as artichokes, asparagus, broccoli, brussels sprouts, cabbage, cauliflower, cucumbers, green peppers, onions, peas, radishes, and raw potatoes. ¨ Fruits such as apricots, bananas, melons, peaches, pears, prunes, and raw apples. ¨ Wheat and wheat bran. · Soak dry beans in water overnight, then dump the water and cook the soaked beans in new water. This can help prevent gas and bloating. · If you have problems with lactose, avoid dairy products such as milk and cheese. · Try not to swallow air. Do not drink through a straw, gulp your food, or chew gum. · Take an over-the-counter medicine. Read and follow all instructions on the label. ¨ Food enzymes, such as Beano, can be added to gas-producing foods to prevent gas. ¨ Antacids, such as Maalox Anti-Gas and Mylanta Gas, can relieve bloating by making you burp. Be careful when you take over-the-counter antacid medicines. Many of these medicines have aspirin in them. Read the label to make sure that you are not taking more than the recommended dose. Too much aspirin can be harmful. ¨ Activated charcoal tablets, such as CharcoCaps, may decrease odor from gas you pass. ¨ If you have problems with lactose, you can take medicines such as Dairy Ease and Lactaid with dairy products to prevent gas and bloating. · Get some exercise regularly. When should you call for help? Call 911 anytime you think you may need emergency care. For example, call if:  ? · You have gas and signs of a heart attack, such as:  ¨ Chest pain or pressure. ¨ Sweating. ¨ Shortness of breath. ¨ Nausea or vomiting. ¨ Pain that spreads from the chest to the neck, jaw, or one or both shoulders or arms. ¨ Dizziness or lightheadedness. ¨ A fast or uneven pulse.   After do not have an account, please click on the \"Sign Up Now\" link. Current as of: May 12, 2017  Content Version: 11.5  © 1387-6863 [employer], [employer]orporated. Care instructions adapted under license by [facility] ([facility]). If you have questions about a medical condition or this instruction, always ask your healthcare professional. [address] any warranty or liability for your use of this information.

## 2018-02-13 NOTE — PROGRESS NOTES
Lea Abraham 30 y.o. female was seen by [place]russell President, MIC on 02/13/18     Wt Readings from Last 3 Encounters:   02/13/18 275 lb (124.7 kg)   01/30/18 266 lb (120.7 kg)   01/25/18 270 lb (122.5 kg)       HPI    30year old  female here for follow-up on EGD/colonoscopy. Her EGD revealed normal esophagus, irregular Z line, normal stomach and normal duodenum. The biopsies of her esophagus revealed mild chronic inflammation. No evidence of H. Pylori or Celiac Sprue. Her colonoscopy revealed a hyperplastic polyp that was removed from her ascending colon. Her colon biopsies were negative for microscopic colitis. Her abdominal pain has improved. She continues to have mild intermittent periumbilical cramping. Intermittent bloating that occurs after eating certain foods. No nausea or vomiting. Her appetite is good and weight is stable. No excess belching or flatulence. Her heartburn and acid reflux is well controlled with Prilosec. No nocturnal awakenings with acid reflux. Non-worsening dysphagia. Her diarrhea has improved. Her bowel pattern is twice daily soft brown mushy stools. No blood in her stools or black tarry stools. No constipation. No family history of stomach or colon cancer. ROS    Review of Systems   Constitutional: Negative for chills, fever, malaise/fatigue and weight loss. HENT: Negative for ear pain, hearing loss and tinnitus. Eyes: Positive for blurred vision. Negative for double vision and photophobia. Respiratory: Positive for cough, sputum production and shortness of breath. Negative for hemoptysis and wheezing. Cardiovascular: Positive for leg swelling. Negative for chest pain and palpitations. Gastrointestinal: Positive for abdominal pain, diarrhea and heartburn. Negative for blood in stool, constipation, melena, nausea and vomiting. Genitourinary: Negative for dysuria, frequency and urgency. Musculoskeletal: Positive for back pain. that she has been smoking Cigarettes. She has been smoking about 1.00 pack per day. She has never used smokeless tobacco. She reports that she drinks alcohol. She reports that she uses drugs, including Marijuana. Family history:  family history includes Diabetes in her maternal grandmother and mother. Objective    Vitals:    02/13/18 0952   BP: 122/84   Pulse: 73   SpO2: 96%        Physical exam    Physical Exam   Constitutional: She is oriented to person, place, and time and well-developed, well-nourished, and in no distress. HENT:   Head: Normocephalic and atraumatic. Eyes: Conjunctivae are normal. Pupils are equal, round, and reactive to light. Neck: Normal range of motion. Neck supple. No JVD present. No tracheal deviation present. No thyromegaly present. Cardiovascular: Normal rate, regular rhythm, normal heart sounds and intact distal pulses. Exam reveals no gallop and no friction rub. No murmur heard. Pulmonary/Chest: Effort normal and breath sounds normal. No respiratory distress. She has no wheezes. She has no rales. She exhibits no tenderness. Abdominal: Soft. Bowel sounds are normal. She exhibits no distension and no mass. There is tenderness. There is no rebound and no guarding. Obese   Musculoskeletal: Normal range of motion. Lymphadenopathy:     She has no cervical adenopathy. Neurological: She is alert and oriented to person, place, and time. Skin: Skin is warm and dry. Psychiatric: Affect normal.   Vitals reviewed.       Admission on 01/30/2018, Discharged on 01/31/2018   Component Date Value Ref Range Status    WBC 01/30/2018 10.8* 4.0 - 10.5 K/CU MM Final    RBC 01/30/2018 5.06  4.2 - 5.4 M/CU MM Final    Hemoglobin 01/30/2018 14.5  12.5 - 16.0 GM/DL Final    Hematocrit 01/30/2018 44.3  37 - 47 % Final    MCV 01/30/2018 87.5  78 - 100 FL Final    MCH 01/30/2018 28.7  27 - 31 PG Final    MCHC 01/30/2018 32.7  32.0 - 36.0 % Final    RDW 01/30/2018 12.1  11.7 - Assessment      ICD-10-CM ICD-9-CM    1. Periumbilical abdominal pain R10.33 789.05    2. Abdominal bloating R14.0 787.3    3. Diarrhea, unspecified type R19.7 787.91    4. Gastroesophageal reflux disease without esophagitis K21.9 530.81    5. Pharyngoesophageal dysphagia R13.14 787.24        Plan    1. Will order Bentyl 10 mg take as needed for periumbilical cramping pain. May improve her diarrhea. 2.  The patient was encouraged to continue taking Prilosec 40 mg once daily for treatment of acid reflux. 3. The patient was provided with information on dysphagia, acid reflux diet and triggers. 4.  The patient was instructed to lose weight ; avoid high fat diet, cut back on highly refined carbohydrates, NSAIDs and alcohol. 5.  The patient was counseled on smoking cessation. 6.  The patient was encouraged to follow-up as needed or sooner if symptoms worsen.

## 2019-04-18 ENCOUNTER — HOSPITAL ENCOUNTER (EMERGENCY)
Age: 39
Discharge: HOME | End: 2019-04-18
Attending: EMERGENCY MEDICINE

## 2019-04-18 ENCOUNTER — APPOINTMENT (OUTPATIENT)
Dept: GENERAL RADIOLOGY | Age: 39
End: 2019-04-18

## 2019-04-18 ENCOUNTER — APPOINTMENT (OUTPATIENT)
Dept: CT IMAGING | Age: 39
End: 2019-04-18

## 2019-04-18 VITALS
DIASTOLIC BLOOD PRESSURE: 110 MMHG | BODY MASS INDEX: 44.98 KG/M2 | SYSTOLIC BLOOD PRESSURE: 151 MMHG | RESPIRATION RATE: 16 BRPM | WEIGHT: 270 LBS | OXYGEN SATURATION: 97 % | TEMPERATURE: 98.3 F | HEART RATE: 85 BPM | HEIGHT: 65 IN

## 2019-04-18 LAB
ALBUMIN SERPL-MCNC: 3.8 GM/DL (ref 3.4–5)
ALP BLD-CCNC: 77 IU/L (ref 40–129)
ALT SERPL-CCNC: 18 U/L (ref 10–40)
ANION GAP SERPL CALCULATED.3IONS-SCNC: 12 MMOL/L (ref 4–16)
AST SERPL-CCNC: 16 IU/L (ref 15–37)
BACTERIA: NEGATIVE /HPF
BASOPHILS ABSOLUTE: 0 K/CU MM
BASOPHILS RELATIVE PERCENT: 0.1 % (ref 0–1)
BILIRUB SERPL-MCNC: 0.4 MG/DL (ref 0–1)
BILIRUBIN URINE: NEGATIVE MG/DL
BLOOD, URINE: NEGATIVE
BUN BLDV-MCNC: 8 MG/DL (ref 6–23)
CALCIUM SERPL-MCNC: 8.6 MG/DL (ref 8.3–10.6)
CHLORIDE BLD-SCNC: 105 MMOL/L (ref 99–110)
CLARITY: CLEAR
CO2: 21 MMOL/L (ref 21–32)
COLOR: YELLOW
CREAT SERPL-MCNC: 0.6 MG/DL (ref 0.6–1.1)
DIFFERENTIAL TYPE: ABNORMAL
EOSINOPHILS ABSOLUTE: 0.1 K/CU MM
EOSINOPHILS RELATIVE PERCENT: 0.8 % (ref 0–3)
GFR AFRICAN AMERICAN: >60 ML/MIN/1.73M2
GFR NON-AFRICAN AMERICAN: >60 ML/MIN/1.73M2
GLUCOSE BLD-MCNC: 110 MG/DL (ref 70–99)
GLUCOSE, URINE: NEGATIVE MG/DL
HCG QUALITATIVE: NEGATIVE
HCT VFR BLD CALC: 49.1 % (ref 37–47)
HEMOGLOBIN: 15.9 GM/DL (ref 12.5–16)
IMMATURE NEUTROPHIL %: 0.3 % (ref 0–0.43)
KETONES, URINE: ABNORMAL MG/DL
LEUKOCYTE ESTERASE, URINE: NEGATIVE
LIPASE: 19 IU/L (ref 13–60)
LYMPHOCYTES ABSOLUTE: 3.1 K/CU MM
LYMPHOCYTES RELATIVE PERCENT: 19.6 % (ref 24–44)
MCH RBC QN AUTO: 29.2 PG (ref 27–31)
MCHC RBC AUTO-ENTMCNC: 32.4 % (ref 32–36)
MCV RBC AUTO: 90.1 FL (ref 78–100)
MONOCYTES ABSOLUTE: 0.5 K/CU MM
MONOCYTES RELATIVE PERCENT: 3.2 % (ref 0–4)
MUCUS: ABNORMAL HPF
NITRITE URINE, QUANTITATIVE: NEGATIVE
NUCLEATED RBC %: 0 %
PDW BLD-RTO: 12.6 % (ref 11.7–14.9)
PH, URINE: 5 (ref 5–8)
PLATELET # BLD: 337 K/CU MM (ref 140–440)
PMV BLD AUTO: 10.5 FL (ref 7.5–11.1)
POTASSIUM SERPL-SCNC: 3.9 MMOL/L (ref 3.5–5.1)
PROTEIN UA: NEGATIVE MG/DL
RBC # BLD: 5.45 M/CU MM (ref 4.2–5.4)
RBC URINE: <1 /HPF (ref 0–6)
SEGMENTED NEUTROPHILS ABSOLUTE COUNT: 11.9 K/CU MM
SEGMENTED NEUTROPHILS RELATIVE PERCENT: 76 % (ref 36–66)
SODIUM BLD-SCNC: 138 MMOL/L (ref 135–145)
SPECIFIC GRAVITY UA: 1.02 (ref 1–1.03)
SQUAMOUS EPITHELIAL: <1 /HPF
TOTAL IMMATURE NEUTOROPHIL: 0.04 K/CU MM
TOTAL NUCLEATED RBC: 0 K/CU MM
TOTAL PROTEIN: 7 GM/DL (ref 6.4–8.2)
TRICHOMONAS: ABNORMAL /HPF
TROPONIN T: <0.01 NG/ML
UROBILINOGEN, URINE: NORMAL MG/DL (ref 0.2–1)
WBC # BLD: 15.7 K/CU MM (ref 4–10.5)
WBC UA: <1 /HPF (ref 0–5)

## 2019-04-18 PROCEDURE — 93005 ELECTROCARDIOGRAM TRACING: CPT | Performed by: EMERGENCY MEDICINE

## 2019-04-18 PROCEDURE — 83690 ASSAY OF LIPASE: CPT

## 2019-04-18 PROCEDURE — 80053 COMPREHEN METABOLIC PANEL: CPT

## 2019-04-18 PROCEDURE — 71045 X-RAY EXAM CHEST 1 VIEW: CPT

## 2019-04-18 PROCEDURE — 96375 TX/PRO/DX INJ NEW DRUG ADDON: CPT

## 2019-04-18 PROCEDURE — 96361 HYDRATE IV INFUSION ADD-ON: CPT

## 2019-04-18 PROCEDURE — 96372 THER/PROPH/DIAG INJ SC/IM: CPT

## 2019-04-18 PROCEDURE — 99285 EMERGENCY DEPT VISIT HI MDM: CPT

## 2019-04-18 PROCEDURE — 2580000003 HC RX 258: Performed by: EMERGENCY MEDICINE

## 2019-04-18 PROCEDURE — 74176 CT ABD & PELVIS W/O CONTRAST: CPT

## 2019-04-18 PROCEDURE — 2500000003 HC RX 250 WO HCPCS: Performed by: EMERGENCY MEDICINE

## 2019-04-18 PROCEDURE — 6360000004 HC RX CONTRAST MEDICATION: Performed by: EMERGENCY MEDICINE

## 2019-04-18 PROCEDURE — 84703 CHORIONIC GONADOTROPIN ASSAY: CPT

## 2019-04-18 PROCEDURE — 85025 COMPLETE CBC W/AUTO DIFF WBC: CPT

## 2019-04-18 PROCEDURE — 96368 THER/DIAG CONCURRENT INF: CPT

## 2019-04-18 PROCEDURE — 96366 THER/PROPH/DIAG IV INF ADDON: CPT

## 2019-04-18 PROCEDURE — 74174 CTA ABD&PLVS W/CONTRAST: CPT

## 2019-04-18 PROCEDURE — 96365 THER/PROPH/DIAG IV INF INIT: CPT

## 2019-04-18 PROCEDURE — 6360000002 HC RX W HCPCS: Performed by: EMERGENCY MEDICINE

## 2019-04-18 PROCEDURE — 36415 COLL VENOUS BLD VENIPUNCTURE: CPT

## 2019-04-18 PROCEDURE — 84484 ASSAY OF TROPONIN QUANT: CPT

## 2019-04-18 PROCEDURE — 81001 URINALYSIS AUTO W/SCOPE: CPT

## 2019-04-18 RX ORDER — CIPROFLOXACIN 500 MG/1
500 TABLET, FILM COATED ORAL 2 TIMES DAILY
Qty: 20 TABLET | Refills: 0 | Status: SHIPPED | OUTPATIENT
Start: 2019-04-18 | End: 2019-04-28

## 2019-04-18 RX ORDER — ONDANSETRON 4 MG/1
4 TABLET, FILM COATED ORAL 3 TIMES DAILY PRN
Qty: 30 TABLET | Refills: 0 | Status: SHIPPED | OUTPATIENT
Start: 2019-04-18 | End: 2019-04-25

## 2019-04-18 RX ORDER — ONDANSETRON 2 MG/ML
4 INJECTION INTRAMUSCULAR; INTRAVENOUS EVERY 6 HOURS PRN
Status: DISCONTINUED | OUTPATIENT
Start: 2019-04-18 | End: 2019-04-19 | Stop reason: HOSPADM

## 2019-04-18 RX ORDER — PROMETHAZINE HYDROCHLORIDE 25 MG/1
25 TABLET ORAL EVERY 6 HOURS PRN
Qty: 30 TABLET | Refills: 0 | Status: SHIPPED | OUTPATIENT
Start: 2019-04-18 | End: 2019-04-25

## 2019-04-18 RX ORDER — 0.9 % SODIUM CHLORIDE 0.9 %
1000 INTRAVENOUS SOLUTION INTRAVENOUS ONCE
Status: COMPLETED | OUTPATIENT
Start: 2019-04-18 | End: 2019-04-18

## 2019-04-18 RX ORDER — METRONIDAZOLE 500 MG/1
500 TABLET ORAL 3 TIMES DAILY
Qty: 30 TABLET | Refills: 0 | Status: SHIPPED | OUTPATIENT
Start: 2019-04-18 | End: 2019-04-28

## 2019-04-18 RX ORDER — CIPROFLOXACIN 2 MG/ML
400 INJECTION, SOLUTION INTRAVENOUS ONCE
Status: COMPLETED | OUTPATIENT
Start: 2019-04-18 | End: 2019-04-18

## 2019-04-18 RX ORDER — OXYCODONE HYDROCHLORIDE AND ACETAMINOPHEN 5; 325 MG/1; MG/1
1 TABLET ORAL EVERY 6 HOURS PRN
Qty: 20 TABLET | Refills: 0 | Status: SHIPPED | OUTPATIENT
Start: 2019-04-18 | End: 2019-04-23

## 2019-04-18 RX ORDER — SODIUM CHLORIDE 0.9 % (FLUSH) 0.9 %
10 SYRINGE (ML) INJECTION 2 TIMES DAILY
Status: DISCONTINUED | OUTPATIENT
Start: 2019-04-18 | End: 2019-04-19 | Stop reason: HOSPADM

## 2019-04-18 RX ORDER — DICYCLOMINE HYDROCHLORIDE 10 MG/ML
20 INJECTION INTRAMUSCULAR ONCE
Status: COMPLETED | OUTPATIENT
Start: 2019-04-18 | End: 2019-04-18

## 2019-04-18 RX ADMIN — METRONIDAZOLE 500 MG: 500 INJECTION, SOLUTION INTRAVENOUS at 20:12

## 2019-04-18 RX ADMIN — DICYCLOMINE HYDROCHLORIDE 20 MG: 20 INJECTION, SOLUTION INTRAMUSCULAR at 17:57

## 2019-04-18 RX ADMIN — CIPROFLOXACIN 400 MG: 2 INJECTION, SOLUTION INTRAVENOUS at 20:12

## 2019-04-18 RX ADMIN — ONDANSETRON 4 MG: 2 INJECTION INTRAMUSCULAR; INTRAVENOUS at 17:57

## 2019-04-18 RX ADMIN — SODIUM CHLORIDE 1000 ML: 9 INJECTION, SOLUTION INTRAVENOUS at 17:57

## 2019-04-18 RX ADMIN — IOPAMIDOL 100 ML: 755 INJECTION, SOLUTION INTRAVENOUS at 21:03

## 2019-04-18 RX ADMIN — SODIUM CHLORIDE, PRESERVATIVE FREE 10 ML: 5 INJECTION INTRAVENOUS at 21:03

## 2019-04-18 ASSESSMENT — PAIN SCALES - GENERAL: PAINLEVEL_OUTOF10: 9

## 2019-04-18 ASSESSMENT — PAIN DESCRIPTION - LOCATION: LOCATION: ABDOMEN;CHEST

## 2019-04-18 ASSESSMENT — PAIN DESCRIPTION - PAIN TYPE: TYPE: ACUTE PAIN

## 2019-04-18 NOTE — ED NOTES
Bed: ED-21  Expected date:   Expected time:   Means of arrival:   Comments:  triage     Isabel Claudio RN  04/18/19 [id number]

## 2019-04-19 ENCOUNTER — TELEPHONE (OUTPATIENT)
Dept: GASTROENTEROLOGY | Age: 39
End: 2019-04-19

## 2019-04-19 PROCEDURE — 93010 ELECTROCARDIOGRAM REPORT: CPT | Performed by: INTERNAL MEDICINE

## 2019-04-19 NOTE — TELEPHONE ENCOUNTER
Patient seen in ED 3/18/19 for abdominal pain/colitis. When should patient follow up and with you or Beto Garcia?

## 2019-04-22 LAB
EKG ATRIAL RATE: 80 BPM
EKG DIAGNOSIS: NORMAL
EKG P AXIS: 54 DEGREES
EKG P-R INTERVAL: 146 MS
EKG Q-T INTERVAL: 368 MS
EKG QRS DURATION: 74 MS
EKG QTC CALCULATION (BAZETT): 424 MS
EKG R AXIS: 21 DEGREES
EKG T AXIS: 10 DEGREES
EKG VENTRICULAR RATE: 80 BPM

## 2019-04-25 ENCOUNTER — OFFICE VISIT (OUTPATIENT)
Dept: FAMILY MEDICINE CLINIC | Age: 39
End: 2019-04-25

## 2019-04-25 VITALS
WEIGHT: 268 LBS | BODY MASS INDEX: 44.6 KG/M2 | DIASTOLIC BLOOD PRESSURE: 92 MMHG | HEART RATE: 86 BPM | OXYGEN SATURATION: 97 % | TEMPERATURE: 98 F | SYSTOLIC BLOOD PRESSURE: 116 MMHG

## 2019-04-25 DIAGNOSIS — R10.84 GENERALIZED ABDOMINAL PAIN: ICD-10-CM

## 2019-04-25 DIAGNOSIS — K52.9 ENTERITIS: Primary | ICD-10-CM

## 2019-04-25 PROCEDURE — 99202 OFFICE O/P NEW SF 15 MIN: CPT | Performed by: NURSE PRACTITIONER

## 2019-04-25 RX ORDER — OXYCODONE HYDROCHLORIDE AND ACETAMINOPHEN 5; 325 MG/1; MG/1
1 TABLET ORAL EVERY 4 HOURS PRN
COMMUNITY
End: 2019-05-20

## 2019-04-25 RX ORDER — DICYCLOMINE HYDROCHLORIDE 10 MG/1
10 CAPSULE ORAL 4 TIMES DAILY
Qty: 120 CAPSULE | Refills: 0 | Status: SHIPPED | OUTPATIENT
Start: 2019-04-25 | End: 2019-05-20

## 2019-04-25 ASSESSMENT — ENCOUNTER SYMPTOMS
VOMITING: 0
CHEST TIGHTNESS: 0
ABDOMINAL PAIN: 1
COUGH: 0
NAUSEA: 1
WHEEZING: 0
DIARRHEA: 1
SHORTNESS OF BREATH: 0
CONSTIPATION: 0
BLOOD IN STOOL: 0

## 2019-04-25 ASSESSMENT — PATIENT HEALTH QUESTIONNAIRE - PHQ9
1. LITTLE INTEREST OR PLEASURE IN DOING THINGS: 0
SUM OF ALL RESPONSES TO PHQ9 QUESTIONS 1 & 2: 0
SUM OF ALL RESPONSES TO PHQ QUESTIONS 1-9: 0
SUM OF ALL RESPONSES TO PHQ QUESTIONS 1-9: 0
2. FEELING DOWN, DEPRESSED OR HOPELESS: 0

## 2019-04-25 NOTE — PROGRESS NOTES
Rusty Heathders  1980  42 y.o. SUBJECT REY:    Chief Complaint   Patient presents with    Follow-Up from Hospital     ED f/u from 4/19/19 for abdominal pain and chest pain, today she feels like she has a fever coming on       Donhudson Andujar is a 42 y.o. female that went to the ED with abdominal pain, nausea, vomiting and diarrhea. Patient reports abdominal pain started on the right side that is now diffuse, constant, currently severe and nonradiating. Patient reports that when her abdominal pain is severe she develops chest pain as well. No shortness of breath. Some persistent nausea and vomiting and patient \"cannot keep anything down\". Additionally, she states she had diarrhea when she went to ED. She states she had no urinary symptoms. She denied vaginal bleeding or discharge. Today, she is in as follow up from the ED visit. She states she has more loose stool now. She has not had not had any vomiting, is having some nausea. She states she is taking the antibiotics and medication for stomach pain which has helped some. She has had some nausea. She denies fevers, chills or sweats. Current Outpatient Medications on File Prior to Visit   Medication Sig Dispense Refill    oxyCODONE-acetaminophen (PERCOCET) 5-325 MG per tablet Take 1 tablet by mouth every 4 hours as needed for Pain.  ciprofloxacin (CIPRO) 500 MG tablet Take 1 tablet by mouth 2 times daily for 10 days 20 tablet 0    metroNIDAZOLE (FLAGYL) 500 MG tablet Take 1 tablet by mouth 3 times daily for 10 days 30 tablet 0     No current facility-administered medications on file prior to visit.         Past Medical History:   Diagnosis Date    Facial injury 06/01/2014    Gastroenteritis     Thyroid disease      Past Surgical History:   Procedure Laterality Date    CHOLECYSTECTOMY      COLONOSCOPY  01/30/2018    single 3 mm sessile polypin ascending colon, multiple cold forceps random biopsies taken from the whole colon    DENTAL SURGERY      ENDOSCOPY, COLON, DIAGNOSTIC  01/30/2018    irregular Z line multiple random biopsies [address] at the [address] History   Problem Relation Age of Onset    Diabetes Mother     Diabetes Maternal Grandmother      Social History     Socioeconomic History    Marital status: Single     Spouse name: Not on file    Number of children: Not on file    Years of education: Not on file    Highest education level: Not on file   Occupational History    Occupation:    Social Needs    Financial resource strain: Not on file    Food insecurity:     Worry: Not on file     Inability: Not on file    Transportation needs:     Medical: Not on file     Non-medical: Not on file   Tobacco Use    Smoking status: Current Every Day Smoker     Packs/day: 0.50     Years: 20.00     Pack years: 10.00     Types: Cigarettes    Smokeless tobacco: Never Used   Substance and Sexual Activity    Alcohol use: Yes     Alcohol/week: 0.0 oz     Comment: occ    Drug use: Yes     Types: Marijuana     Comment: used last about a week ago as of 4/25/19    Sexual activity: Yes   Lifestyle    Physical activity:     Days per week: Not on file     Minutes per session: Not on file    Stress: Not on file   Relationships    Social connections:     Talks on phone: Not on file     Gets together: Not on file     Attends Jew service: Not on file     Active member of club or organization: Not on file     Attends meetings of clubs or organizations: Not on file     Relationship status: Not on file    Intimate partner violence:     Fear of current or ex partner: Not on file     Emotionally abused: Not on file     Physically abused: Not on file     Forced sexual activity: Not on file   Other Topics Concern    Not on file   Social History Narrative    Not on file       Review of Systems   Constitutional: Negative for activity change, appetite change, chills, diaphoresis, fatigue, fever and unexpected weight change. Respiratory: Negative for cough, chest tightness, shortness of breath and wheezing. Cardiovascular: Negative for chest pain and palpitations. Gastrointestinal: Positive for abdominal pain, diarrhea and nausea. Negative for blood in stool, constipation and vomiting. Genitourinary: Negative for decreased urine volume, difficulty urinating, dysuria, frequency, urgency, vaginal discharge and vaginal pain. Neurological: Negative for dizziness, light-headedness and headaches. OBJECTIVE:     BP (!) 116/92   Pulse 86   Temp 98 °F (36.7 °C) (Oral)   Wt 268 lb (121.6 kg)   SpO2 97%   BMI 44.60 kg/m²     Physical Exam   Constitutional: She is oriented to person, place, and time. She appears well-developed and well-nourished. No distress. HENT:   Head: Normocephalic and atraumatic. Eyes: Pupils are equal, round, and reactive to light. Conjunctivae are normal.   Neck: Normal range of motion. Neck supple. Cardiovascular: Normal rate, regular rhythm and normal heart sounds. Pulmonary/Chest: Effort normal and breath sounds normal.   Abdominal: Soft. Bowel sounds are normal. There is no hepatosplenomegaly. There is tenderness in the right upper quadrant and left upper quadrant. There is no guarding. Musculoskeletal: Normal range of motion. Neurological: She is alert and oriented to person, place, and time. Skin: Skin is warm and dry. She is not diaphoretic. Psychiatric: She has a normal mood and affect. Her behavior is normal. Judgment and thought content normal.   Vitals reviewed.       Results in Past 30 Days  Result Component Current Result Ref Range Previous Result Ref Range   Alb 3.8 (4/18/2019) 3.4 - 5.0 GM/DL Not in Time Range    Alkaline Phosphatase 77 (4/18/2019) 40 - 129 IU/L Not in Time Range    ALT 18 (4/18/2019) 10 - 40 U/L Not in Time Range    AST 16 (4/18/2019) 15 - 37 IU/L Not in Time Range    BUN 8 (4/18/2019) 6 - 23 MG/DL Not in Time Range    Calcium 8.6 (4/18/2019) 8.3 - 10.6 MG/DL Not in Time Range    Chloride 105 (4/18/2019) 99 - 110 mMol/L Not in Time Range    CO2 21 (4/18/2019) 21 - 32 MMOL/L Not in Time Range    CREATININE 0.6 (4/18/2019) 0.6 - 1.1 MG/DL Not in Time Range    GFR  >60 (4/18/2019) >60 mL/min/1.73m2 Not in Time Range    GFR Non- >60 (4/18/2019) >60 mL/min/1.73m2 Not in Time Range    Glucose 110 (H) (4/18/2019) 70 - 99 MG/DL Not in Time Range    Potassium 3.9 (4/18/2019) 3.5 - 5.1 MMOL/L Not in Time Range    Sodium 138 (4/18/2019) 135 - 145 MMOL/L Not in Time Range    Total Bilirubin 0.4 (4/18/2019) 0.0 - 1.0 MG/DL Not in Time Range    Total Protein 7.0 (4/18/2019) 6.4 - 8.2 GM/DL Not in Time Range      No results found for: Jose A Hanson, LDLCALC    Lab Results   Component Value Date    WBC 15.7 04/18/2019    WBC 6.9 01/31/2018    WBC 10.8 01/30/2018    NEUTROABS 6.2 06/07/2016    NEUTROABS 3.9 05/16/2013    HGB 15.9 04/18/2019    HGB 13.8 01/31/2018    HGB 14.5 01/30/2018    HCT 49.1 04/18/2019    HCT 42.1 01/31/2018    HCT 44.3 01/30/2018    MCV 90.1 04/18/2019    MCV 88.3 01/31/2018    MCV 87.5 01/30/2018     04/18/2019     01/31/2018     01/30/2018    SEGSABS 11.9 04/18/2019    SEGSABS 3.0 01/31/2018    SEGSABS 6.6 01/30/2018    LYMPHSABS 3.1 04/18/2019    MONOSABS 0.5 04/18/2019    EOSABS 0.1 04/18/2019    BASOSABS 0.0 04/18/2019     Lab Results   Component Value Date    TSH 3.45 02/03/2017     Lab Results   Component Value Date    LABALBU 3.8 04/18/2019    BILITOT 0.4 04/18/2019    BILIDIR 0.1 05/05/2011    IBILI 0.1 05/05/2011    AST 16 04/18/2019    ALT 18 04/18/2019    ALKPHOS 77 04/18/2019             No results found for this visit on 04/25/19. ASSESSMENT AND PLAN:     1. Enteritis  - dicyclomine (BENTYL) 10 MG capsule; Take 1 capsule by mouth 4 times daily  Dispense: 120 capsule; Refill: 0    2.  Generalized abdominal pain    Fluids, rest  Complete antibiotics prescribed from ED  Keep appointment with GI specialist  Make appointment with PCP  Verbalized understanding and agreement with plan      Return if symptoms worsen or fail to improve. Care discussed with patient. Patient educated on signs and symptoms of exacerbation and when to seek further medical attention. Advised to call for any problems, questions, or concerns. Patient verbalizes understanding and agrees with plan. Medications reviewed and reconciled. Continue current medications. Appropriate prescriptions are ordered. Risks and benefits of meds are discussed. After visit summary provided.

## 2019-04-25 NOTE — PATIENT INSTRUCTIONS
Fluids, rest  Complete antibiotics prescribed from ED  Keep appointment with GI specialist  Make appointment with PCP  Verbalized understanding and agreement with plan

## 2019-05-07 ENCOUNTER — OFFICE VISIT (OUTPATIENT)
Dept: GASTROENTEROLOGY | Age: 39
End: 2019-05-07
Payer: MEDICARE

## 2019-05-07 VITALS
RESPIRATION RATE: 16 BRPM | SYSTOLIC BLOOD PRESSURE: 138 MMHG | HEART RATE: 82 BPM | OXYGEN SATURATION: 95 % | DIASTOLIC BLOOD PRESSURE: 80 MMHG | WEIGHT: 272.6 LBS | BODY MASS INDEX: 45.42 KG/M2 | HEIGHT: 65 IN

## 2019-05-07 DIAGNOSIS — R19.7 DIARRHEA, UNSPECIFIED TYPE: ICD-10-CM

## 2019-05-07 DIAGNOSIS — K52.9 COLITIS: Primary | ICD-10-CM

## 2019-05-07 DIAGNOSIS — R10.31 RIGHT LOWER QUADRANT ABDOMINAL PAIN: ICD-10-CM

## 2019-05-07 DIAGNOSIS — R93.89 ABNORMAL FINDING ON CT SCAN: ICD-10-CM

## 2019-05-07 PROCEDURE — 99215 OFFICE O/P EST HI 40 MIN: CPT | Performed by: NURSE PRACTITIONER

## 2019-05-07 RX ORDER — POLYETHYLENE GLYCOL 3350 17 G/17G
238 POWDER, FOR SOLUTION ORAL ONCE
Qty: 1 BOTTLE | Refills: 0 | Status: SHIPPED | OUTPATIENT
Start: 2019-05-07 | End: 2019-05-07

## 2019-05-07 RX ORDER — OMEPRAZOLE 40 MG/1
40 CAPSULE, DELAYED RELEASE ORAL DAILY
Qty: 30 CAPSULE | Refills: 3 | Status: SHIPPED | OUTPATIENT
Start: 2019-05-07 | End: 2019-05-20

## 2019-05-07 ASSESSMENT — ENCOUNTER SYMPTOMS
DIARRHEA: 1
VOMITING: 0
BLOOD IN STOOL: 0
CONSTIPATION: 0
NAUSEA: 0
BACK PAIN: 1
COUGH: 1
PHOTOPHOBIA: 0
EYE PAIN: 0
COLOR CHANGE: 0
WHEEZING: 0
SHORTNESS OF BREATH: 1

## 2019-05-07 NOTE — PROGRESS NOTES
Wade Abraham 42 y.o. female was seen by MIC Chen on 05/08/19     Wt Readings from Last 3 Encounters:   05/07/19 272 lb 9.6 oz (123.7 kg)   04/25/19 268 lb (121.6 kg)   04/18/19 270 lb (122.5 kg)       JORDANA Hays is a pleasant 42 y.o.  female who presents today for follow-up on abdominal pain and colitis. She has a past medical history of facial injury, gastroenteritis, obesity and thyroid disease. She went to [facility] emergency room on 4- for acute right lowe quadrant abdominal pain, nausea, vomiting and non bloody diarrhea. Her CT of the abdomen/pelvis revealed severe circumferential wall thickening of the colon most pronounced surrounding and a small amount of free fluid within the pelvis, findings involve the cecum, ascending colon and transverse colon consistent with severe colitis. 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 She was sent home on Cipro and Flagyl. Her lab work revealed WBC 15.7, RBC 5.45, Hgb 15.9, HCt 49.1, and Platelets 502. CMP with Sodium 138, Potassium 3.9, Creatinine 0.6, Calcium 8.6, Albumin 3.8, Total Bilirubin 0.4, ALT 18, AST 16 and Alkaline Phosphatase 77 and GFR >60. Her symptoms have improved some and she completed her antibiotics last Thursday. No blood or mucus in her stools. Her current abdominal pain is a 4/10 dull cramping pain to her right lower quadrant with no radiation to her back. Bentyl helps. She had one watery stool today and mentioned yesterday having two to three liquid brown stools. No mucus, blood or melena in her stools. No pain with defecation.   Her last colonoscopy was done on 1- revealing a hyperplastic polyp that was removed from her ascending colon, otherwise normal colon. Her colon biopsies were negative for microscopic colitis. Her typical bowel pattern is daily with loose brown stools. She reports holding her stool at times while she is at work. Occasional constipation. She has incomplete evacuation. Her appetite is good and weight is stable. No nausea or vomiting. No excess belching or flatulence. Her heartburn and acid reflux has worsened since she went off Prilosec due to lack of insurance. No nocturnal awakenings with acid reflux. Non-worsening dysphagia. No family history of stomach or colon cancer.             ROS  Review of Systems   Constitutional: Negative for appetite change, chills, diaphoresis, fatigue, fever and unexpected weight change. HENT: Negative for ear pain, hearing loss and tinnitus. Eyes: Positive for visual disturbance. Negative for photophobia and pain. Respiratory: Positive for cough and shortness of breath. Negative for wheezing. Cardiovascular: Positive for leg swelling. Negative for chest pain and palpitations. Gastrointestinal: Positive for abdominal pain (mild RLQ) and diarrhea. Negative for blood in stool, constipation, nausea and vomiting. Endocrine: Negative for cold intolerance, heat intolerance and polydipsia. Genitourinary: Negative for dysuria, frequency and urgency. Musculoskeletal: Positive for back pain. Negative for myalgias and neck pain. Skin: Negative for color change, pallor and rash. Allergic/Immunologic: Negative for environmental allergies and food allergies. Neurological: Positive for headaches. Negative for dizziness, seizures and weakness. Hematological: Does not bruise/bleed easily. Psychiatric/Behavioral: Negative for dysphoric mood, sleep disturbance and suicidal ideas. The patient is not nervous/anxious.         Allergies  No Known Allergies    Medications  Current Outpatient Medications   Medication Sig Dispense Refill    omeprazole (PRILOSEC) 40 MG delayed release capsule Take 1 capsule by mouth daily 30 capsule 3    bisacodyl (BISACODYL) 5 MG EC tablet Take 4 tablets once for colonoscopy prep 4 tablet 0    oxyCODONE-acetaminophen (PERCOCET) 5-325 MG per tablet Take 1 tablet by mouth every 4 hours as needed for Pain.  dicyclomine (BENTYL) 10 MG capsule Take 1 capsule by mouth 4 times daily 120 capsule 0     No current facility-administered medications for this visit. Past medical history:   She has a past medical history of Facial injury, Gastroenteritis, and Thyroid disease. Past surgical history:  She has a past surgical history that includes Cholecystectomy; Dental surgery; Colonoscopy (01/30/2018); and Endoscopy, colon, diagnostic (01/30/2018). Social History:  She reports that she has been smoking cigarettes. She has a 10.00 pack-year smoking history. She has never used smokeless tobacco. She reports that she drinks alcohol. She reports that she has current or past drug history. Drug: Marijuana. Family history:  Her family history includes Diabetes in her maternal grandmother and mother. Objective    Vitals:    05/07/19 1423   BP: 138/80   Pulse:    Resp:    SpO2:         Physical exam    Physical Exam   Constitutional: She is oriented to person, place, and time. She appears well-developed and well-nourished. HENT:   Head: Normocephalic and atraumatic. Eyes: Pupils are equal, round, and reactive to light. Conjunctivae and EOM are normal.   Neck: Normal range of motion. Neck supple. No JVD present. No tracheal deviation present. No thyromegaly present. Cardiovascular: Normal rate, regular rhythm, normal heart sounds and intact distal pulses. Exam reveals no gallop and no friction rub. No murmur heard. Pulmonary/Chest: Effort normal and breath sounds normal. No stridor. No respiratory distress. She has no wheezes. She has no rales. She exhibits no tenderness. Abdominal: Soft.  Bowel sounds are normal. She exhibits no distension and no mass. There is tenderness (right lower quadrant). There is no rebound and no guarding. Morbid obese   Musculoskeletal: Normal range of motion. Lymphadenopathy:     She has no cervical adenopathy. Neurological: She is alert and oriented to person, place, and time. Skin: Skin is warm and dry. Psychiatric: She has a normal mood and affect. Vitals reviewed.       Admission on 04/18/2019, Discharged on 04/18/2019   Component Date Value Ref Range Status    Ventricular Rate 04/18/2019 80  BPM Final    Atrial Rate 04/18/2019 80  BPM Final    P-R Interval 04/18/2019 146  ms Final    QRS Duration 04/18/2019 74  ms Final    Q-T Interval 04/18/2019 368  ms Final    QTc Calculation (Bazett) 04/18/2019 424  ms Final    P Axis 04/18/2019 54  degrees Final    R Axis 04/18/2019 21  degrees Final    T Axis 04/18/2019 10  degrees Final    Diagnosis 04/18/2019    Final                    Value:Normal sinus rhythm  Cannot rule out Anterior infarct (cited on or before 06-JAN-2018)  Abnormal ECG  When compared with ECG of 06-JAN-2018 17:09,  No significant change was found  Confirmed by Leah Kapadia MD, Hortensia Red ([id number]) on 4/19/2019 5:17:23 AM      WBC 04/18/2019 15.7* 4.0 - 10.5 K/CU MM Final    RBC 04/18/2019 5.45* 4.2 - 5.4 M/CU MM Final    Hemoglobin 04/18/2019 15.9  12.5 - 16.0 GM/DL Final    Hematocrit 04/18/2019 49.1* 37 - 47 % Final    MCV 04/18/2019 90.1  78 - 100 FL Final    MCH 04/18/2019 29.2  27 - 31 PG Final    MCHC 04/18/2019 32.4  32.0 - 36.0 % Final    RDW 04/18/2019 12.6  11.7 - 14.9 % Final    Platelets 91/41/3414 337  140 - 440 K/CU MM Final    MPV 04/18/2019 10.5  7.5 - 11.1 FL Final    Differential Type 04/18/2019 AUTOMATED DIFFERENTIAL   Final    Segs Relative 04/18/2019 76.0* 36 - 66 % Final    Lymphocytes % 04/18/2019 19.6* 24 - 44 % Final    Monocytes % 04/18/2019 3.2  0 - 4 % Final    Eosinophils % 04/18/2019 0.8  0 - 3 % Final    Basophils % 04/18/2019 0.1  0 - 1 % Final    Segs Absolute 04/18/2019 11.9  K/CU MM Final    Lymphocytes # 04/18/2019 3.1  K/CU MM Final    Monocytes # 04/18/2019 0.5  K/CU MM Final    Eosinophils # 04/18/2019 0.1  K/CU MM Final    Basophils # 04/18/2019 0.0  K/CU MM Final    Nucleated RBC % 04/18/2019 0.0  % Final    Total Nucleated RBC 04/18/2019 0.0  K/CU MM Final    Total Immature Neutrophil 04/18/2019 0.04  K/CU MM Final    Immature Neutrophil % 04/18/2019 0.3  0 - 0.43 % Final    Sodium 04/18/2019 138  135 - 145 MMOL/L Final    Potassium 04/18/2019 3.9  3.5 - 5.1 MMOL/L Final    Chloride 04/18/2019 105  99 - 110 mMol/L Final    CO2 04/18/2019 21  21 - 32 MMOL/L Final    BUN 04/18/2019 8  6 - 23 MG/DL Final    CREATININE 04/18/2019 0.6  0.6 - 1.1 MG/DL Final    Glucose 04/18/2019 110* 70 - 99 MG/DL Final    Calcium 04/18/2019 8.6  8.3 - 10.6 MG/DL Final    Alb 04/18/2019 3.8  3.4 - 5.0 GM/DL Final    Total Protein 04/18/2019 7.0  6.4 - 8.2 GM/DL Final    Total Bilirubin 04/18/2019 0.4  0.0 - 1.0 MG/DL Final    ALT 04/18/2019 18  10 - 40 U/L Final    AST 04/18/2019 16  15 - 37 IU/L Final    Alkaline Phosphatase 04/18/2019 77  40 - 129 IU/L Final    GFR Non- 04/18/2019 >60  >60 mL/min/1.73m2 Final    GFR  04/18/2019 >60  >60 mL/min/1.73m2 Final    Anion Gap 04/18/2019 12  4 - 16 Final    Troponin T 04/18/2019 <0.010  <0.01 NG/ML Final    Comment: (NOTE)  PATIENTS WITH HIGH LEVELS OF BIOTIN ORAL INTAKE (ie >5mg/day) MAY   HAVE FALSELY DECREASED TROPONIN T LEVELS. SAMPLES COLLECETED WITHIN 8   HOURS OF BIOTIN INTAKE MAY REQUIRE ADDITIONAL INFORMATION FOR   DIAGNOSIS.       Color, UA 04/18/2019 YELLOW  YELLOW Final    Clarity, UA 04/18/2019 CLEAR  CLEAR Final    Glucose, Urine 04/18/2019 NEGATIVE  NEGATIVE MG/DL Final    Bilirubin Urine 04/18/2019 NEGATIVE  NEGATIVE MG/DL Final    Ketones, Urine 04/18/2019 MODERATE* NEGATIVE MG/DL Final    Specific Gravity, UA 04/18/2019 1.018  1.001 - 1.035 Final    Blood, Urine 04/18/2019 NEGATIVE  NEGATIVE Final    pH, Urine 04/18/2019 5.0  5.0 - 8.0 Final    Protein, UA 04/18/2019 NEGATIVE  NEGATIVE MG/DL Final    Urobilinogen, Urine 04/18/2019 NORMAL  0.2 - 1.0 MG/DL Final    Nitrite Urine, Quantitative 04/18/2019 NEGATIVE  NEGATIVE Final    Leukocyte Esterase, Urine 04/18/2019 NEGATIVE  NEGATIVE Final    RBC, UA 04/18/2019 <1  0 - 6 /HPF Final    WBC, UA 04/18/2019 <1  0 - 5 /HPF Final    Bacteria, UA 04/18/2019 NEGATIVE  NEGATIVE /HPF Final    Squam Epithel, UA 04/18/2019 <1  /HPF Final    Mucus, UA 04/18/2019 RARE* NEGATIVE HPF Final    Trichomonas, UA 04/18/2019 NONE SEEN  NONE SEEN /HPF Final    hCG Qual 04/18/2019 NEGATIVE   Final    Comment: (NOTE)  REFERENCE RANGE: NEGATIVE  A NEGATIVE VALUE OR VALUE IN THE INDETERMINATE RANGE DOES NOT RULE   OUT PREGNANCY AND IF NEEDED SHOULD BE REDRAWN IN 2 DAYS AND ASSAYED   AGAIN BECAUSE HCG VALUES IN NORMAL PREGNANCY DOUBLE EVERY 48 HRS. QUALITATIVE INTERPRETATION                                    NEGATIVE          <7.0                                    INDETERMINATE     7.0-25.0                                    POSITIVE          >25.0      Lipase 04/18/2019 19  13 - 60 IU/L Final       Assessment    ICD-10-CM    1. Colitis K52.9 C difficile Molecular/PCR     OVA & PARASITE ID/COUNT #1     GI Bacterial Pathogens By PCR     GIARDIA ANTIGEN     Calprotectin Stool     Fecal leukocytes     COLONOSCOPY (Diagnostic)     CBC Auto Differential     COMPREHENSIVE METABOLIC PANEL   2. Diarrhea, unspecified type R19.7 C difficile Molecular/PCR     OVA & PARASITE ID/COUNT #1     GI Bacterial Pathogens By PCR     GIARDIA ANTIGEN     Calprotectin Stool     Fecal leukocytes     COLONOSCOPY (Diagnostic)   3. Abnormal finding on CT scan R93.89 COLONOSCOPY (Diagnostic)   4. Right lower quadrant abdominal pain R10.31              Plan  1.   Will plan for a colonoscopy with MAC anesthesia. The patient was informed of the risks and benefits of the procedure. 2.  Colitis noted on CT of abdomen/pelvis most likely from infectious or inflammatory process. Will order stool studies and patient was instructed to complete as soon as able. She completed her Cipro and Flagyl with some improvement in her symptoms. The patient was provided with information on colitis. 3.  Leukocytosis most likely due to colitis. Will order repeat CBC and CMP due to leukocytosis noted during her ER visit. Her temperature today was 97.9 and denies fever or chills. 4.  Diarrhea with watery episode today most likely due to colitis will order stool studies to rule out infectious vs.inflammatory process. 5.  Right lower quadrant abdominal pain most likely due to colitis evidenced on CT of abdomen/pelvis. She completed CTA in the ER to rule out ischemic colitis. 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 Recommend cessation of smoking and weight loss. 6.  Further recommendations for follow-up will be determined after the colonoscopy has been completed.

## 2019-05-07 NOTE — PATIENT INSTRUCTIONS
Patient Education        Colonoscopy: Before Your Procedure  What is a colonoscopy? A colonoscopy is a test that lets a doctor look inside your colon. The doctor uses a thin, lighted tube called a colonoscope to look for problems. These include small growths called polyps, cancer, or bleeding. During the test, the doctor can take samples of tissue that can be checked for cancer or other problems. This is called a biopsy. The doctor can also take out polyps. Before the test, you will need to stop eating solid foods. You also will drink a liquid or take a tablet that cleans out your colon. This helps your doctor be able to see inside your colon during the test.  Follow-up care is a key part of your treatment and safety. Be sure to make and go to all appointments, and call your doctor if you are having problems. It's also a good idea to know your test results and keep a list of the medicines you take. What happens before the procedure?   Preparing for the procedure    · Understand exactly what procedure is planned, along with the risks, benefits, and other options. · Tell your doctors ALL the medicines, vitamins, supplements, and herbal remedies you take. Some of these can increase the risk of bleeding or interact with anesthesia.     · If you take blood thinners, such as warfarin (Coumadin), clopidogrel (Plavix), or aspirin, be sure to talk to your doctor. He or she will tell you if you should stop taking these medicines before your procedure. Make sure that you understand exactly what your doctor wants you to do.     · Your doctor will tell you which medicines to take or stop before your procedure. You may need to stop taking certain medicines a week or more before the procedure. So talk to your doctor as soon as you can.     · If you have an advance directive, let your doctor know. It may include a living will and a durable power of  for health care.  Bring a copy to the hospital. If you don't have one, you may want to prepare one. It lets your doctor and loved ones know your health care wishes. Doctors advise that everyone prepare these papers before any type of surgery or procedure.    Before the procedure    · Follow your doctor's directions about when to stop eating solid foods and drink only clear liquids. You can drink water, clear juices, clear broths, flavored ice pops, and gelatin (such as Jell-O). Do not eat or drink anything red or purple. This includes grape juice and grape-flavored ice pops. It also includes fruit punch and cherry gelatin.     · Drink the \"colon prep\" liquid as your doctor tells you. You will want to stay home, because the liquid will make you go to the bathroom a lot. Your stools will be loose and watery. It is very important to drink all of the liquid. If you have problems drinking it, call your doctor. Some doctors may have you take a tablet rather than drink a liquid.     · Do not eat any solid foods after you drink the colon prep.     · Stop drinking clear liquids 6 to 8 hours before the test.   Procedures can be stressful. This information will help you understand what you can expect. And it will help you safely prepare for your procedure. What happens on the day of the procedure? · Follow the instructions exactly about when to stop eating and drinking. If you don't, your procedure may be canceled. If your doctor told you to take your medicines on the day of the procedure, take them with only a sip of water.     · Take a bath or shower before you come in for your procedure. Do not apply lotions, perfumes, deodorants, or nail polish.     · Take off all jewelry and piercings. And take out contact lenses, if you wear them.    At the doctor's office or hospital   · Bring a picture ID.     · You will be kept comfortable and safe by your anesthesia provider.  The anesthesia may make you sleep.     · You will lie on your back or your side with your knees drawn up toward your belly. The doctor will gently put a gloved finger into your anus. Then the doctor puts the scope in and moves it into your colon. The scope goes in easily because it is lubricated.     · The doctor may also use small tools to take tissue samples for a biopsy or to remove polyps. This does not hurt.     · The test usually takes 30 to 45 minutes. But it may take longer. It depends on what is found and what is done. Going home   · Be sure you have someone to drive you home. Anesthesia and pain medicine make it unsafe for you to drive.     · You will be given more specific instructions about recovering from your procedure. When should you call your doctor? · You have questions or concerns.     · You don't understand how to prepare for your procedure.     · You are having trouble with the bowel prep.     · You become ill before the procedure (such as fever, flu, or a cold).     · You need to reschedule or have changed your mind about having the procedure. Where can you learn more? Go to [email or web address] org and sign in to your MobileIron account. Enter C315 in the [employer] box to learn more about \"Colonoscopy: Before Your Procedure. \"     If you do not have an account, please click on the \"Sign Up Now\" link. Current as of: March 27, 2018  Content Version: 11.9  © 3513-4482 Healthwise, Incorporated. Care instructions adapted under license by [facility] ([facility]). If you have questions about a medical condition or this instruction, always ask your healthcare professional. [place] [address] any warranty or liability for your use of this information. Patient Education        omeprazole  Pronunciation:  oh MEP ra zol  Brand:  FIRST Omeprazole, Omeprazole + SyrSpend SF Anali, PriLOSEC, PriLOSEC OTC  What is the most important information I should know about omeprazole? Follow all directions on your medicine label and package.  Tell each of your healthcare providers about all your medical conditions, allergies, and all medicines you use. What is omeprazole? Omeprazole is a proton pump inhibitor that decreases the amount of acid produced in the stomach. Omeprazole is used to treat symptoms of gastroesophageal reflux disease (GERD) and other conditions caused by excess stomach acid. Omeprazole is also used to promote healing of erosive esophagitis (damage to your esophagus caused by stomach acid). Omeprazole may also be given together with antibiotics to treat gastric ulcer caused by infection with helicobacter pylori (H. pylori). Over-the-counter (OTC) omeprazole is used to help control heartburn that occurs 2 or more days per week. This medicine not for immediate relief of heartburn symptoms. OTC omeprazole must be taken on a regular basis for 14 days in a row. Omeprazole may also be used for purposes not listed in this medication guide. What should I discuss with my healthcare provider before taking omeprazole? Heartburn can mimic early symptoms of a heart attack. Get emergency medical help if you have chest pain that spreads to your jaw or shoulder and you feel anxious or light-headed. You should not use omeprazole if you are allergic to it, or if:  · you are allergic to other proton pump inhibitors (esomeprazole, lansoprazole, pantoprazole, rabeprazole, Dexilant, Nexium, Prevacid, Protonix, AcipHex, and others); or  · you also take HIV medication that contains rilpivirine (such as Mabelene Lager, Gabby Overcast).   Ask a doctor or pharmacist if it is safe for you to use omeprazole if you have other medical conditions, especially:  · trouble or pain with swallowing;  · bloody or black stools, vomit that looks like blood or coffee grounds;  · heartburn that has lasted for over 3 months;  · frequent chest pain, heartburn with wheezing;  · unexplained weight loss;  · nausea or vomiting, stomach pain;  · liver disease;  · low levels of magnesium in your blood; or  · osteoporosis or low bone mineral density (osteopenia). You may be more likely to have a broken bone in your hip, wrist, or spine while taking a proton pump inhibitor. Talk with your doctor about ways to keep your bones healthy. It is not known whether this medicine will harm an unborn baby. Tell your doctor if you are pregnant or plan to become pregnant. Do not use this medicine without a doctor's advice if you are breast-feeding. Do not give this medicine to a child without medical advice. How should I take omeprazole? Follow all directions on your prescription label and read all medication guides or instruction sheets. Use the medicine exactly as directed. Use Prilosec OTC (over-the-counter) exactly as directed on the label, or as prescribed by your doctor. Read and carefully follow any Instructions for Use provided with your medicine. Ask your doctor or pharmacist if you do not understand these instructions. Shake the oral suspension (liquid) before you measure a dose. Use the dosing syringe provided, or use a medicine dose-measuring device (not a kitchen spoon). If you cannot swallow a capsule whole, open it and sprinkle the medicine into a spoonful of applesauce. Swallow the mixture right away without chewing. Do not save it for later use. You must dissolve omeprazole powder in a small amount of water. This mixture can either be swallowed or given through a nasogastric (NG) feeding tube using a catheter-tipped syringe. OTC omeprazole should be taken for only 14 days in a row. Allow at least 4 months to pass before you start a new 14-day course of treatment. Use this medicine for the full prescribed length of time, even if your symptoms quickly improve. Taking omeprazole long-term could cause you to develop a vitamin B-12 deficiency. Talk to your doctor about how to manage this condition. Call your doctor if your symptoms do not improve, or if they get worse.   Some conditions are treated with a combination of omeprazole and antibiotics. Use all medications as directed and read all medication guides you receive. Do not change your dose or dosing schedule without your doctor's advice. This medicine can affect the results of certain medical tests. Tell any doctor who treats you that you are using omeprazole. Store at room temperature away from moisture and heat. What happens if I miss a dose? Take the medicine as soon as you can, but skip the missed dose if it is almost time for your next dose. Do not take two doses at one time. What happens if I overdose? Seek emergency medical attention or call the Poison Help line at 1-961.808.4028. What should I avoid while taking omeprazole? Diarrhea may be a sign of a new infection. If you have diarrhea that is watery or bloody, call your doctor before using anti-diarrhea medicine. What are the possible side effects of omeprazole? Get emergency medical help if you have signs of an allergic reaction: hives; difficulty breathing; swelling of your face, lips, tongue, or throat. Stop using omeprazole and call your doctor at once if you have:  · severe stomach pain, diarrhea that is watery or bloody;  · a rash or joint pain;  · new or unusual pain in your wrist, thigh, hip, or back;  · seizure (convulsions);  · kidney problems --urinating more or less than usual, blood in your urine, swelling, rapid weight gain; or  · symptoms of low magnesium --drowsiness, confusion, feeling irritable, fast heartbeats, tremors, twitching, muscle cramps, numbness, tingling, or seizure. Common side effects may include:  · stomach pain, gas;  · nausea, vomiting, diarrhea; or  · headache. This is not a complete list of side effects and others may occur. Call your doctor for medical advice about side effects. You may report side effects to FDA at 6-414-AVA-4391. What other drugs will affect omeprazole? Sometimes it is not safe to use certain medications at the same time. Some drugs can affect your blood levels of other drugs you take, which may increase side effects or make the medications less effective. Ask a doctor or pharmacist before using omeprazole with any other medications, especially:  · diazepam (Valium);  · digoxin;  · methotrexate;  · mycophenolate mofetil;  · tacrolimus;  · an antibiotic --ampicillin, amoxicillin, clarithromycin, rifampin;  · antifungal medicine --ketoconazole, voriconazole;  · HIV medication --atazanavir, nelfinavir, saquinavir;  · iron-containing medicines --ferrous fumarate, ferrous gluconate, ferrous sulfate, and others; or  · medicine to prevent blood clots --cilostazol, clopidogrel, warfarin (Coumadin, Althia Jolly). This list is not complete and many other drugs may affect omeprazole. This includes prescription and over-the-counter medicines, vitamins, and herbal products. Not all possible drug interactions are listed here. Where can I get more information? Your pharmacist can provide more information about omeprazole. Remember, keep this and all other medicines out of the reach of children, never share your medicines with others, and use this medication only for the indication prescribed. Every effort has been made to ensure that the information provided by [address] is accurate, up-to-date, and complete, but no guarantee is made to that effect. Drug information contained herein may be time sensitive. [facility] information has been compiled for use by healthcare practitioners and consumers in the [place] and therefore [facility] does not warrant that uses outside of the [place] are appropriate, unless specifically indicated otherwise. [facility] drug information does not endorse drugs, diagnose patients or recommend therapy.  [facility] drug information is an informational resource designed to assist licensed healthcare practitioners in caring for their patients and/or to serve consumers viewing this service as a supplement to, and not a substitute for, the expertise, skill, knowledge and judgment of healthcare practitioners. The absence of a warning for a given drug or drug combination in no way should be construed to indicate that the drug or drug combination is safe, effective or appropriate for any given patient. [facility] does not assume any responsibility for any aspect of healthcare administered with the aid of information [facility] provides. The information contained herein is not intended to cover all possible uses, directions, precautions, warnings, drug interactions, allergic reactions, or adverse effects. If you have questions about the drugs you are taking, check with your doctor, nurse or pharmacist.  Copyright 1621-2338 [address]: 18.01. Revision date: 3/14/2018. Care instructions adapted under license by [facility] ([facility]). If you have questions about a medical condition or this instruction, always ask your healthcare professional. [place] [address] any warranty or liability for your use of this information. Patient Education        Colitis: Care Instructions  Your Care Instructions  Colitis is the medical term for swelling (inflammation) of the intestine. It can be caused by different things, such as an infection or loss of blood flow in the intestine. Other causes are problems like Crohn's disease or ulcerative colitis. Symptoms may include fever, diarrhea that may be bloody, or belly pain. Sometimes symptoms go away without treatment. But you may need treatment or more tests, such as blood tests or a stool test. Or you may need imaging tests like a CT scan or a colonoscopy. In some cases, the doctor may want to test a sample of tissue from the intestine. This test is called a biopsy. The doctor has checked you carefully, but problems can develop later. If you notice any problems or new symptoms, get medical treatment right away.   Follow-up care is a key part of your treatment and safety. Be sure to make and go to all appointments, and call your doctor if you are having problems. It's also a good idea to know your test results and keep a list of the medicines you take. How can you care for yourself at home? · Rest until you feel better. · Your doctor may recommend that you eat bland foods. These include rice, dry toast or crackers, bananas, and applesauce. · To prevent dehydration, drink plenty of fluids. Choose water and other caffeine-free clear liquids until you feel better. If you have kidney, heart, or liver disease and have to limit fluids, talk with your doctor before you increase the amount of fluids you drink. · Be safe with medicines. Take your medicines exactly as prescribed. Call your doctor if you think you are having a problem with your medicine. You will get more details on the specific medicines your doctor prescribes. When should you call for help? Call 911 anytime you think you may need emergency care. For example, call if:    · You passed out (lost consciousness).     · Your stools are maroon or very bloody.    Call your doctor now or seek immediate medical care if:    · You have new or worse belly pain.     · You have a fever.     · You are vomiting.     · You cannot pass stools or gas.     · You have new or more blood in your stools.    Watch closely for changes in your health, and be sure to contact your doctor if:    · You have new or worse symptoms.     · You are losing weight.     · You do not get better as expected. Where can you learn more? Go to https://DataOceans.[employer]. org and sign in to your Viyet account. Enter H406 in the [facility] box to learn more about \"Colitis: Care Instructions. \"     If you do not have an account, please click on the \"Sign Up Now\" link. Current as of: March 27, 2018  Content Version: 11.9  © 6963-5552 [employer], Incorporated. Care instructions adapted under license by [facility] ([facility]).  If you have questions about a medical condition or this instruction, always ask your healthcare professional. [address] any warranty or liability for your use of this information. Patient Education        Gastroesophageal Reflux Disease (GERD): Care Instructions  Your Care Instructions    Gastroesophageal reflux disease (GERD) is the backward flow of stomach acid into the esophagus. The esophagus is the tube that leads from your throat to your stomach. A one-way valve prevents the stomach acid from moving up into this tube. When you have GERD, this valve does not close tightly enough. If you have mild GERD symptoms including heartburn, you may be able to control the problem with antacids or over-the-counter medicine. Changing your diet, losing weight, and making other lifestyle changes can also help reduce symptoms. Follow-up care is a key part of your treatment and safety. Be sure to make and go to all appointments, and call your doctor if you are having problems. It's also a good idea to know your test results and keep a list of the medicines you take. How can you care for yourself at home? · Take your medicines exactly as prescribed. Call your doctor if you think you are having a problem with your medicine. · Your doctor may recommend over-the-counter medicine. For mild or occasional indigestion, antacids, such as Tums, Gaviscon, Mylanta, or Maalox, may help. Your doctor also may recommend over-the-counter acid reducers, such as Pepcid AC, Tagamet HB, Zantac 75, or Prilosec. Read and follow all instructions on the label. If you use these medicines often, talk with your doctor. · Change your eating habits. ? It's best to eat several small meals instead of two or three large meals. ? After you eat, wait 2 to 3 hours before you lie down. ? Chocolate, mint, and alcohol can make GERD worse. ?  Spicy foods, foods that have a lot of acid (like tomatoes and oranges), and coffee can make GERD symptoms worse in some people. If your symptoms are worse after you eat a certain food, you may want to stop eating that food to see if your symptoms get better. · Do not smoke or chew tobacco. Smoking can make GERD worse. If you need help quitting, talk to your doctor about stop-smoking programs and medicines. These can increase your chances of quitting for good. · If you have GERD symptoms at night, raise the head of your bed 6 to 8 inches by putting the frame on blocks or placing a foam wedge under the head of your mattress. (Adding extra pillows does not work.)  · Do not wear tight clothing around your middle. · Lose weight if you need to. Losing just 5 to 10 pounds can help. When should you call for help? Call your doctor now or seek immediate medical care if:    · You have new or different belly pain.     · Your stools are black and tarlike or have streaks of blood.    Watch closely for changes in your health, and be sure to contact your doctor if:    · Your symptoms have not improved after 2 days.     · Food seems to catch in your throat or chest.   Where can you learn more? Go to https://[employer]. org and sign in to your iPawn account. Enter H573 in the EatWith box to learn more about \"Gastroesophageal Reflux Disease (GERD): Care Instructions. \"     If you do not have an account, please click on the \"Sign Up Now\" link. Current as of: March 27, 2018  Content Version: 11.9  © 0213-9119 [employer]. Care instructions adapted under license by [facility] ([facility]). If you have questions about a medical condition or this instruction, always ask your healthcare professional. [place] [address] any warranty or liability for your use of this information. Patient Education        Stopping Smoking: Care Instructions  Your Care Instructions  Cigarette smokers crave the nicotine in cigarettes.  Giving it up is much harder than simply changing a habit. Your body has to stop craving the nicotine. It is hard to quit, but you can do it. There are many tools that people use to quit smoking. You may find that combining tools works best for you. There are several steps to quitting. First you get ready to quit. Then you get support to help you. After that, you learn new skills and behaviors to become a nonsmoker. For many people, a necessary step is getting and using medicine. Your doctor will help you set up the plan that best meets your needs. You may want to attend a smoking cessation program to help you quit smoking. When you choose a program, look for one that has proven success. Ask your doctor for ideas. You will greatly increase your chances of success if you take medicine as well as get counseling or join a cessation program.  Some of the changes you feel when you first quit tobacco are uncomfortable. Your body will miss the nicotine at first, and you may feel short-tempered and grumpy. You may have trouble sleeping or concentrating. Medicine can help you deal with these symptoms. You may struggle with changing your smoking habits and rituals. The last step is the tricky one: Be prepared for the smoking urge to continue for a time. This is a lot to deal with, but keep at it. You will feel better. Follow-up care is a key part of your treatment and safety. Be sure to make and go to all appointments, and call your doctor if you are having problems. It's also a good idea to know your test results and keep a list of the medicines you take. How can you care for yourself at home? · Ask your family, friends, and coworkers for support. You have a better chance of quitting if you have help and support. · Join a support group, such as Nicotine Anonymous, for people who are trying to quit smoking.   · Consider signing up for a smoking cessation program, such as the American Lung Association's Freedom from Smoking program.  · Get text messaging

## 2019-05-08 ASSESSMENT — ENCOUNTER SYMPTOMS: ABDOMINAL PAIN: 1

## 2019-05-20 ENCOUNTER — ANESTHESIA EVENT (OUTPATIENT)
Dept: OPERATING ROOM | Age: 39
End: 2019-05-20
Payer: MEDICARE

## 2019-05-20 ENCOUNTER — TELEPHONE (OUTPATIENT)
Dept: GASTROENTEROLOGY | Age: 39
End: 2019-05-20

## 2019-05-20 NOTE — ANESTHESIA PRE PROCEDURE
Department of Anesthesiology  Preprocedure Note       Name:  Ivelisse Be   Age:  42 y.o.  :  1980                                          MRN:  [id number]         Date:  2019      Surgeon: Marcello Crowell):  Alex Ho MD    Procedure: COLONOSCOPY DIAGNOSTIC (N/A )    Medications prior to admission:   Prior to Admission medications    Medication Sig Start Date End Date Taking? Authorizing Provider   bisacodyl (BISACODYL) 5 MG EC tablet Take 4 tablets once for colonoscopy prep 19   Edy Alarcon APRN - CNP       Current medications:    No current facility-administered medications for this encounter. Current Outpatient Medications   Medication Sig Dispense Refill    bisacodyl (BISACODYL) 5 MG EC tablet Take 4 tablets once for colonoscopy prep 4 tablet 0       Allergies:  No Known Allergies    Problem List:    Patient Active Problem List   Diagnosis Code    Hypothyroidism E03.9    Allergic rhinitis J30.9    Tobacco use disorder F17.200    Irregular menstrual bleeding N92.6    Enteritis K52.9    Abdominal pain R10.9       Past Medical History:        Diagnosis Date    Facial injury 2014    Gastroenteritis     Thyroid disease        Past Surgical History:        Procedure Laterality Date    CHOLECYSTECTOMY      COLONOSCOPY  2018    single 3 mm sessile polypin ascending colon, multiple cold forceps random biopsies taken from the whole colon    DENTAL SURGERY      ENDOSCOPY, COLON, DIAGNOSTIC  2018    irregular Z line multiple random biopsies taken at the [address] [place] History:    Social History     Tobacco Use    Smoking status: Current Every Day Smoker     Packs/day: 0.50     Years: 20.00     Pack years: 10.00     Types: Cigarettes    Smokeless tobacco: Never Used   Substance Use Topics    Alcohol use:  Yes     Alcohol/week: 0.0 oz     Comment: occ                                Ready to quit: Not Answered  Counseling given: Not Answered      Vital Signs (Current): There were no vitals filed for this visit. BP Readings from Last 3 Encounters:   05/07/19 138/80   04/25/19 (!) 116/92   04/18/19 (!) 151/110       NPO Status:                                                                                 BMI:   Wt Readings from Last 3 Encounters:   05/07/19 272 lb 9.6 oz (123.7 kg)   04/25/19 268 lb (121.6 kg)   04/18/19 270 lb (122.5 kg)     There is no height or weight on file to calculate BMI.    CBC:   Lab Results   Component Value Date    WBC 15.7 04/18/2019    RBC 5.45 04/18/2019    HGB 15.9 04/18/2019    HCT 49.1 04/18/2019    MCV 90.1 04/18/2019    RDW 12.6 04/18/2019     04/18/2019       CMP:   Lab Results   Component Value Date     04/18/2019    K 3.9 04/18/2019    K 4.0 01/30/2018     04/18/2019    CO2 21 04/18/2019    BUN 8 04/18/2019    CREATININE 0.6 04/18/2019    GFRAA >60 04/18/2019    AGRATIO 1.3 02/03/2017    LABGLOM >60 04/18/2019    LABGLOM 122 05/16/2013    GLUCOSE 110 04/18/2019    PROT 7.0 04/18/2019    PROT 7.0 05/05/2011    CALCIUM 8.6 04/18/2019    BILITOT 0.4 04/18/2019    ALKPHOS 77 04/18/2019    AST 16 04/18/2019    ALT 18 04/18/2019       POC Tests: No results for input(s): POCGLU, POCNA, POCK, POCCL, POCBUN, POCHEMO, POCHCT in the last 72 hours.     Coags:   Lab Results   Component Value Date    PROTIME 10.5 01/06/2018    INR 0.92 01/06/2018    APTT 24.7 01/06/2018       HCG (If Applicable):   Lab Results   Component Value Date    PREGTESTUR NEGATIVE 01/30/2018        ABGs: No results found for: PHART, PO2ART, WFB9RAB, EEY3LSO, BEART, I9BOERCS     Type & Screen (If Applicable):  No results found for: LABABO, [address]    Anesthesia Evaluation  Patient summary reviewed no history of anesthetic complications:   Airway: Mallampati: II  TM distance: >3 FB   Neck ROM: full  Mouth opening: > = 3 FB Dental:          Pulmonary: Cardiovascular:  Exercise tolerance: good (>4 METS),                     Neuro/Psych:               GI/Hepatic/Renal:             Endo/Other:    (+) hypothyroidism::., .                 Abdominal:           Vascular:                                      Anesthesia Plan      general and MAC     ASA 2       Induction: intravenous. Anesthetic plan and risks discussed with patient. Use of blood products discussed with patient whom.                  Cee Wood, SOPHIE - CRNA   5/20/2019

## 2019-05-20 NOTE — PROGRESS NOTES
1. Hx of anesthesia complications? -- no  2. Hx of difficult airway/intubation? -- no  3. Hx of changed chest pain/CHF exacerbation in last 6 mo. ? -- no  4. Home O2 dependent? -- no  5. Able to climb one flight of stairs w/o SOB? -- yes  6. Recent COPD exacerbation or pneumonia/bronchitis that has been treated in last      month? -- no       1. Do not eat or drink anything after 12 midnight (or____hours) unless instructed by your doctor prior to surgery. This includes no water, chewing gum or mints. 2. Follow your directions as prescribed by the doctor for your procedure and medications. 3.Check with your Doctor regarding stopping Plavix, Coumadin, Lovenox,Effient,Pradaxa,Xarelto, Fragmin or other blood thinners and follow their instructions. 4. Do not smoke, and do not drink any alcoholic beverages 24 hours prior to surgery. This includes NA Beer. 5. You may brush your teeth and gargle the morning of surgery. DO NOT SWALLOW WATER   6. You MUST make arrangements for a responsible adult to take you home after your surgery and be able to check on you every couple hours for the day. You will not be allowed to leave alone or drive yourself home. It is strongly suggested someone stay with you the first 24 hrs. Your surgery will be cancelled if you do not have a ride home. 7. Please wear simple, loose fitting clothing to the hospital.  [place]  not bring valuables (money, credit cards, checkbooks, etc.) Do not wear any makeup (including no eye makeup) or nail polish on your fingers or toes. 8. DO NOT wear any jewelry or piercings on day of surgery. All body piercing jewelry must be removed. 9. If you have dentures, they will be removed before going to the OR; we will provide you a container. If you wear contact lenses or glasses, they will be removed; please bring a case for them.              10. If you  have a Living Will and Durable Power of  for Healthcare, please bring in a copy.             11 Please bring picture ID,  insurance card, paperwork from the doctors office    (H & P, Consent, & card for implantable devices).

## 2019-05-22 ENCOUNTER — ANESTHESIA (OUTPATIENT)
Dept: OPERATING ROOM | Age: 39
End: 2019-05-22
Payer: MEDICARE

## 2019-05-22 ENCOUNTER — HOSPITAL ENCOUNTER (OUTPATIENT)
Age: 39
Discharge: HOME | End: 2019-05-22
Attending: INTERNAL MEDICINE
Payer: MEDICARE

## 2019-05-22 ENCOUNTER — TELEPHONE (OUTPATIENT)
Dept: GASTROENTEROLOGY | Age: 39
End: 2019-05-22

## 2019-05-22 ENCOUNTER — HOSPITAL ENCOUNTER (OUTPATIENT)
Age: 39
Setting detail: OUTPATIENT SURGERY
Discharge: HOME | End: 2019-05-22
Attending: INTERNAL MEDICINE | Admitting: INTERNAL MEDICINE
Payer: MEDICARE

## 2019-05-22 ENCOUNTER — HOSPITAL ENCOUNTER (OUTPATIENT)
Dept: CT IMAGING | Age: 39
Discharge: HOME | End: 2019-05-22
Attending: INTERNAL MEDICINE
Payer: MEDICARE

## 2019-05-22 VITALS — SYSTOLIC BLOOD PRESSURE: 114 MMHG | OXYGEN SATURATION: 99 % | DIASTOLIC BLOOD PRESSURE: 71 MMHG

## 2019-05-22 VITALS
RESPIRATION RATE: 16 BRPM | WEIGHT: 266 LBS | OXYGEN SATURATION: 96 % | TEMPERATURE: 96.7 F | HEART RATE: 60 BPM | BODY MASS INDEX: 44.32 KG/M2 | HEIGHT: 65 IN | DIASTOLIC BLOOD PRESSURE: 86 MMHG | SYSTOLIC BLOOD PRESSURE: 100 MMHG

## 2019-05-22 DIAGNOSIS — K52.9 COLITIS: ICD-10-CM

## 2019-05-22 DIAGNOSIS — K52.9 COLITIS: Primary | ICD-10-CM

## 2019-05-22 DIAGNOSIS — R93.89 ABNORMAL FINDING ON CT SCAN: Primary | ICD-10-CM

## 2019-05-22 PROBLEM — R10.31 RIGHT LOWER QUADRANT ABDOMINAL PAIN: Status: ACTIVE | Noted: 2019-05-22

## 2019-05-22 LAB
PREGNANCY TEST URINE, POC: NEGATIVE
PREGNANCY TEST URINE, POC: NORMAL

## 2019-05-22 PROCEDURE — 3700000001 HC ADD 15 MINUTES (ANESTHESIA): Performed by: INTERNAL MEDICINE

## 2019-05-22 PROCEDURE — 81025 URINE PREGNANCY TEST: CPT

## 2019-05-22 PROCEDURE — 7100000010 HC PHASE II RECOVERY - FIRST 15 MIN: Performed by: INTERNAL MEDICINE

## 2019-05-22 PROCEDURE — 7100000011 HC PHASE II RECOVERY - ADDTL 15 MIN: Performed by: INTERNAL MEDICINE

## 2019-05-22 PROCEDURE — 2580000003 HC RX 258: Performed by: INTERNAL MEDICINE

## 2019-05-22 PROCEDURE — 6360000002 HC RX W HCPCS: Performed by: NURSE ANESTHETIST, CERTIFIED REGISTERED

## 2019-05-22 PROCEDURE — 3700000000 HC ANESTHESIA ATTENDED CARE: Performed by: INTERNAL MEDICINE

## 2019-05-22 PROCEDURE — 45380 COLONOSCOPY AND BIOPSY: CPT | Performed by: INTERNAL MEDICINE

## 2019-05-22 PROCEDURE — 2500000003 HC RX 250 WO HCPCS: Performed by: NURSE ANESTHETIST, CERTIFIED REGISTERED

## 2019-05-22 PROCEDURE — 45385 COLONOSCOPY W/LESION REMOVAL: CPT | Performed by: INTERNAL MEDICINE

## 2019-05-22 PROCEDURE — 3609010600 HC COLONOSCOPY POLYPECTOMY SNARE/COLD BIOPSY: Performed by: INTERNAL MEDICINE

## 2019-05-22 PROCEDURE — 2709999900 HC NON-CHARGEABLE SUPPLY: Performed by: INTERNAL MEDICINE

## 2019-05-22 PROCEDURE — 2500000003 HC RX 250 WO HCPCS: Performed by: INTERNAL MEDICINE

## 2019-05-22 PROCEDURE — 88305 TISSUE EXAM BY PATHOLOGIST: CPT

## 2019-05-22 PROCEDURE — 74177 CT ABD & PELVIS W/CONTRAST: CPT

## 2019-05-22 PROCEDURE — 6360000004 HC RX CONTRAST MEDICATION: Performed by: INTERNAL MEDICINE

## 2019-05-22 RX ORDER — LIDOCAINE HYDROCHLORIDE 20 MG/ML
INJECTION, SOLUTION EPIDURAL; INFILTRATION; INTRACAUDAL; PERINEURAL PRN
Status: DISCONTINUED | OUTPATIENT
Start: 2019-05-22 | End: 2019-05-22 | Stop reason: SDUPTHER

## 2019-05-22 RX ORDER — SODIUM CHLORIDE, SODIUM LACTATE, POTASSIUM CHLORIDE, CALCIUM CHLORIDE 600; 310; 30; 20 MG/100ML; MG/100ML; MG/100ML; MG/100ML
INJECTION, SOLUTION INTRAVENOUS CONTINUOUS
Status: DISCONTINUED | OUTPATIENT
Start: 2019-05-22 | End: 2019-05-22 | Stop reason: HOSPADM

## 2019-05-22 RX ORDER — PROPOFOL 10 MG/ML
INJECTION, EMULSION INTRAVENOUS PRN
Status: DISCONTINUED | OUTPATIENT
Start: 2019-05-22 | End: 2019-05-22 | Stop reason: SDUPTHER

## 2019-05-22 RX ADMIN — PROPOFOL 50 MG: 10 INJECTION, EMULSION INTRAVENOUS at 08:49

## 2019-05-22 RX ADMIN — BARIUM SULFATE 450 ML: 1 SUSPENSION ORAL at 11:30

## 2019-05-22 RX ADMIN — LIDOCAINE HYDROCHLORIDE 200 MG: 20 INJECTION, SOLUTION EPIDURAL; INFILTRATION; INTRACAUDAL; PERINEURAL at 08:40

## 2019-05-22 RX ADMIN — PROPOFOL 50 MG: 10 INJECTION, EMULSION INTRAVENOUS at 08:43

## 2019-05-22 RX ADMIN — PROPOFOL 100 MG: 10 INJECTION, EMULSION INTRAVENOUS at 08:40

## 2019-05-22 RX ADMIN — SODIUM CHLORIDE, POTASSIUM CHLORIDE, SODIUM LACTATE AND CALCIUM CHLORIDE: 600; 310; 30; 20 INJECTION, SOLUTION INTRAVENOUS at 08:21

## 2019-05-22 RX ADMIN — IOPAMIDOL 75 ML: 755 INJECTION, SOLUTION INTRAVENOUS at 11:29

## 2019-05-22 RX ADMIN — PROPOFOL 50 MG: 10 INJECTION, EMULSION INTRAVENOUS at 08:41

## 2019-05-22 RX ADMIN — PROPOFOL 50 MG: 10 INJECTION, EMULSION INTRAVENOUS at 08:46

## 2019-05-22 ASSESSMENT — PAIN - FUNCTIONAL ASSESSMENT: PAIN_FUNCTIONAL_ASSESSMENT: 0-10

## 2019-05-22 ASSESSMENT — PAIN SCALES - GENERAL
PAINLEVEL_OUTOF10: 0
PAINLEVEL_OUTOF10: 0

## 2019-05-22 ASSESSMENT — PAIN DESCRIPTION - DESCRIPTORS: DESCRIPTORS: CRAMPING

## 2019-05-22 NOTE — ANESTHESIA POSTPROCEDURE EVALUATION
Department of Anesthesiology  Postprocedure Note    Patient: Yoselin Zepeda  MRN: [id number]  YOB: 1980  Date of evaluation: 5/22/2019  Time:  8:59 AM     Procedure Summary     Date:  05/22/19 Room / Location:  [address] [facility] OR    Anesthesia Start:  2605 Anesthesia Stop:  0061    Procedure:  COLONOSCOPY DIAGNOSTIC (N/A ) Diagnosis:  (Abnormal Imaging, Diarrhea)    Surgeon:  Jerman Lynn MD Responsible Provider:  SOPHIE Collazo CRNA    Anesthesia Type:  general, MAC ASA Status:  2          Anesthesia Type: general, MAC    Margarita Phase I:      Margarita Phase II:      Last vitals: Reviewed and per EMR flowsheets.        Anesthesia Post Evaluation    Patient location during evaluation: bedside  Patient participation: complete - patient participated  Level of consciousness: awake and alert  Pain score: 0  Airway patency: patent  Nausea & Vomiting: no nausea and no vomiting  Complications: no  Cardiovascular status: hemodynamically stable  Respiratory status: acceptable, room air and spontaneous ventilation  Hydration status: euvolemic

## 2019-05-22 NOTE — H&P
session: Not on file    Stress: Not on file   Relationships    Social connections:     Talks on phone: Not on file     Gets together: Not on file     Attends [employer] service: Not on file     Active member of club or organization: Not on file     Attends meetings of clubs or organizations: Not on file     Relationship status: Not on file    Intimate partner violence:     Fear of current or ex partner: Not on file     Emotionally abused: Not on file     Physically abused: Not on file     Forced sexual activity: Not on file   Other Topics Concern    Not on file   Social History Narrative    Not on file       Family History:        Problem Relation Age of Onset    Diabetes Mother     Diabetes Maternal Grandmother          Review of Systems:  Constitutional: No weight loss, no fevers. Eyes: No problems with vision. ENT: No nose or sinus problems, no oral problems, no throat problems or hoarseness. Cardiovascular: No chest pain, no leg pain with walking, no palpitations, no ankle swelling. Respiratory: No shortness of breath, no persistent cough, no wheezing. Endocrine: No increased thirst, no increased urination. Gastrointestinal: No heartburn, no dysphagia, + abdominal pain, no loss of appetite, no nausea or vomiting, no diarrhea, no constipation, no melena, no hematochezia, no sceleral icterus or jaundice. Skin: No rashes. Musculoskeletal: No trouble walking or standing, no joint pain, no muscle pain. Allergy/Immune System: No allergies, no frequent infections. Neurological: No memory difficulties, no temporary blindness, no difficulty speaking, no headaches, no numbness. Psychiatric: No depression, no suicidal ideation, no auditory hallucinations. Hematological/Lymphatic: No lymphadenopathy, no frequent nose bleeds, no easy bruising. Genitourinary: No penile/vaginal discharge, no pain with urination, no trouble starting urinary stream, no hematuria.     Physical Examination  Vital Signs: BP (!) 126/102   Pulse 73   Temp 97.8 °F (36.6 °C) (Temporal)   Resp 16   Ht 5' 5\" (1.651 m)   Wt 266 lb (120.7 kg)   SpO2 94%   BMI 44.26 kg/m²  Body mass index is 44.26 kg/m². General: The patient is a 42 y.o. female in No acute distress. EYE: EOMI, Gross visual field was normal. Pupils reactive, The conjunctive was normal, with no erythema. ENT: no lymphadenopathy, oropharynx is without erythema, edema, or exudates, and moist mucus membranes. The nasal mucosa, septum and turbinates were normal without inflammation or edema. Neck: There was no mass on palpitation, tracheal position was in the middle of the neck and there was no enlarged thyroid. There was no JVD. Lungs: The respiratory was not in labor and the patient did not use accessory muscle. Clear to auscultation bilaterally, no wheeze/crackles. Cardiovascular: Regular rate and rhythm, normal S1 & S2, no murmurs, rubs or gallops appreciated. Peripheral pulses were normal and no tenderness. Abdomen: Soft, non-tender, no rebound or guarding or peritoneal features, no masses, no hepatosplenomegaly. Extremities: upper and lower extremities were warm and dry, no clubbing, cyanosis, edema. Neuro: CN II-XII were intact grossly. Sensation was normal on all extremities and the muscle strength was normal and symmetry. Rectum:  There was no fistular, fissure, external hemorrhoid, tenderness, abscess, erythema or discharge    Labs:  CBC  WBC   Date Value Ref Range Status   04/18/2019 15.7 (H) 4.0 - 10.5 K/CU MM Final     Hemoglobin   Date Value Ref Range Status   04/18/2019 15.9 12.5 - 16.0 GM/DL Final     Hematocrit   Date Value Ref Range Status   04/18/2019 49.1 (H) 37 - 47 % Final     MCV   Date Value Ref Range Status   04/18/2019 90.1 78 - 100 FL Final        Glucose   Date Value Ref Range Status   04/18/2019 110 (H) 70 - 99 MG/DL Final     CO2   Date Value Ref Range Status   04/18/2019 21 21 - 32 MMOL/L Final     BUN   Date Value Ref Range Status

## 2019-05-22 NOTE — TELEPHONE ENCOUNTER
Betty:    Could you please refer her to a vascular surgeon for MUNIR occlusion?     Thanks    [employer]

## 2019-05-22 NOTE — PROGRESS NOTES
48 denies needs, friend at bedside, call light in reach  914 CT scan schedule, update provided to dr Angelica Enrique  917 head of bed up.  925 pt dressing

## 2019-05-22 NOTE — TELEPHONE ENCOUNTER
Please notify patient that I have referred her to a vascular surgeon to discuss possible MUNIR occlusion noted on recent CT of abdomen and pelvis. 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 Thank you.

## 2019-05-22 NOTE — OP NOTE
BRIEF COLONOSCOPY REPORT:    Pre-op Diagnosis: RLQ abd pain    Post-op Diagnosis: see impression below    Anesthesia: MAC  Estimated blood loss: less than 50 cc  Implants: none  Drains: none  Complications: none  Specimens- none- or as referred to below     Impression:    1) normal TI and colon   2) one polyp was removed from the sigmoid colon           Suggest:   1) CT now              2) repeat the colonoscopy after the path report is reviewed    After the colonoscopy, she denied abd pain. Her RLQ abd pain has resolved. She denied N/V. On P/E, abd was soft, no distention, there was very mild tenderness on the LLQ and RUQ. BS was normal through out the abd. I ordered a STAT CT     The complete operative report (including photos) is available in the following locations:   1) soft chart now   2) report will also be scanned and can then be found by going to \"chart review\" then \"notes\" then \"op report\" or by going to \"chart review\" then \"media\" then \"op report\". For review of photos, may need to go to page 2.

## 2019-05-30 ENCOUNTER — HOSPITAL ENCOUNTER (OUTPATIENT)
Age: 39
Discharge: HOME | End: 2019-05-30
Payer: MEDICARE

## 2019-05-30 ENCOUNTER — OFFICE VISIT (OUTPATIENT)
Dept: GASTROENTEROLOGY | Age: 39
End: 2019-05-30
Payer: MEDICARE

## 2019-05-30 VITALS
BODY MASS INDEX: 45.52 KG/M2 | DIASTOLIC BLOOD PRESSURE: 82 MMHG | SYSTOLIC BLOOD PRESSURE: 134 MMHG | HEART RATE: 75 BPM | HEIGHT: 65 IN | WEIGHT: 273.2 LBS

## 2019-05-30 DIAGNOSIS — K52.9 COLITIS: ICD-10-CM

## 2019-05-30 DIAGNOSIS — K52.9 COLITIS: Primary | ICD-10-CM

## 2019-05-30 PROBLEM — I73.9 PVD (PERIPHERAL VASCULAR DISEASE) (HCC): Status: ACTIVE | Noted: 2019-05-30

## 2019-05-30 LAB
BASOPHILS ABSOLUTE: 0 K/CU MM
BASOPHILS RELATIVE PERCENT: 0.4 % (ref 0–1)
DIFFERENTIAL TYPE: ABNORMAL
EOSINOPHILS ABSOLUTE: 0.2 K/CU MM
EOSINOPHILS RELATIVE PERCENT: 1.5 % (ref 0–3)
HCT VFR BLD CALC: 44.9 % (ref 37–47)
HEMOGLOBIN: 14.2 GM/DL (ref 12.5–16)
IMMATURE NEUTROPHIL %: 0.3 % (ref 0–0.43)
LYMPHOCYTES ABSOLUTE: 3.3 K/CU MM
LYMPHOCYTES RELATIVE PERCENT: 31.8 % (ref 24–44)
MCH RBC QN AUTO: 28.9 PG (ref 27–31)
MCHC RBC AUTO-ENTMCNC: 31.6 % (ref 32–36)
MCV RBC AUTO: 91.3 FL (ref 78–100)
MONOCYTES ABSOLUTE: 0.6 K/CU MM
MONOCYTES RELATIVE PERCENT: 5.4 % (ref 0–4)
NUCLEATED RBC %: 0 %
PDW BLD-RTO: 12.8 % (ref 11.7–14.9)
PLATELET # BLD: 331 K/CU MM (ref 140–440)
PMV BLD AUTO: 11.6 FL (ref 7.5–11.1)
RBC # BLD: 4.92 M/CU MM (ref 4.2–5.4)
SEGMENTED NEUTROPHILS ABSOLUTE COUNT: 6.2 K/CU MM
SEGMENTED NEUTROPHILS RELATIVE PERCENT: 60.6 % (ref 36–66)
TOTAL IMMATURE NEUTOROPHIL: 0.03 K/CU MM
TOTAL NUCLEATED RBC: 0 K/CU MM
WBC # BLD: 10.2 K/CU MM (ref 4–10.5)

## 2019-05-30 PROCEDURE — 36415 COLL VENOUS BLD VENIPUNCTURE: CPT

## 2019-05-30 PROCEDURE — 85025 COMPLETE CBC W/AUTO DIFF WBC: CPT

## 2019-05-30 PROCEDURE — 99213 OFFICE O/P EST LOW 20 MIN: CPT | Performed by: INTERNAL MEDICINE

## 2019-05-30 NOTE — PROGRESS NOTES
SURGERY      ENDOSCOPY, COLON, DIAGNOSTIC  01/30/2018    irregular Z line multiple random biopsies taken at the [facility]       Medications:    No current outpatient medications on file prior to visit. No current facility-administered medications on file prior to visit. Allergies: No Known Allergies    Social History:    Social History     Socioeconomic History    Marital status: Single     Spouse name: Not on file    Number of children: Not on file    Years of education: Not on file    Highest education level: Not on file   Occupational History    Occupation:    Social Needs    Financial resource strain: Not on file    Food insecurity:     Worry: Not on file     Inability: Not on file    Transportation needs:     Medical: Not on file     Non-medical: Not on file   Tobacco Use    Smoking status: Current Every Day Smoker     Packs/day: 0.50     Years: 20.00     Pack years: 10.00     Types: Cigarettes    Smokeless tobacco: Never Used   Substance and Sexual Activity    Alcohol use:  Yes     Alcohol/week: 0.0 oz     Comment: occ    Drug use: Yes     Types: Marijuana     Comment: used last about a week ago as of 4/18/19    Sexual activity: Yes   Lifestyle    Physical activity:     Days per week: Not on file     Minutes per session: Not on file    Stress: Not on file   Relationships    Social connections:     Talks on phone: Not on file     Gets together: Not on file     Attends [employer]ism service: Not on file     Active member of club or organization: Not on file     Attends meetings of clubs or organizations: Not on file     Relationship status: Not on file    Intimate partner violence:     Fear of current or ex partner: Not on file     Emotionally abused: Not on file     Physically abused: Not on file     Forced sexual activity: Not on file   Other Topics Concern    Not on file   Social History Narrative    Not on file       Family History:        Problem Relation Age of Onset    past medical history of gastroenteritis, thyroid disease, cholecystectomy, hyperplastic polyp in the colon, colitis based on CT scan had come to my office to follow-up her history of nausea, vomiting, irregular bowel movements, and abdominal pain. 1. History of  symptom was due to  Function bowel disease, such as IBS. Currently she has bene asymptomatic. 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 Acid reflux. She had prescription of Prilosec. However patient had not started it. I recommend starting omeprazole. The patient's upper endoscopy did not show Quan's esophagus. Patient did not have nausea vomiting dysphagia or odynophagia. I do not plan to repeat upper endoscopy. 3.  History of colitis that was found by CT scan might be due to viral or bacterial colitis. Her the most recent colonoscopy showed a normal colon and the biopsies of colon was normal.  The patient right now did not have any symptoms. Therefore, the colitis has resolved. I had a spend more than 45 minutes to explain the pathophysiology, etiology, life stye change, diet change for IBS. More than 50% of time was spent on face-to-face consultation. I'm going to leave the practice in August, my office will call her to set up a new appointment with a new doctor in 4 months. Patricio Bates MD PhD [facility]  [address] [facility] [address]  0ffice: [phone]  Fax: [phone]

## 2019-09-17 ENCOUNTER — OFFICE VISIT (OUTPATIENT)
Dept: GASTROENTEROLOGY | Age: 39
End: 2019-09-17
Payer: MEDICARE

## 2019-09-17 VITALS
OXYGEN SATURATION: 98 % | DIASTOLIC BLOOD PRESSURE: 80 MMHG | HEIGHT: 65 IN | BODY MASS INDEX: 45.65 KG/M2 | SYSTOLIC BLOOD PRESSURE: 126 MMHG | WEIGHT: 274 LBS | HEART RATE: 87 BPM

## 2019-09-17 DIAGNOSIS — K21.9 GASTROESOPHAGEAL REFLUX DISEASE WITHOUT ESOPHAGITIS: ICD-10-CM

## 2019-09-17 DIAGNOSIS — E66.01 CLASS 3 SEVERE OBESITY DUE TO EXCESS CALORIES WITHOUT SERIOUS COMORBIDITY WITH BODY MASS INDEX (BMI) OF 45.0 TO 49.9 IN ADULT (HCC): ICD-10-CM

## 2019-09-17 DIAGNOSIS — Z87.19 HISTORY OF COLITIS: ICD-10-CM

## 2019-09-17 DIAGNOSIS — R93.89 ABNORMAL FINDING ON CT SCAN: ICD-10-CM

## 2019-09-17 DIAGNOSIS — R19.4 ALTERED BOWEL HABITS: Primary | ICD-10-CM

## 2019-09-17 PROCEDURE — G8417 CALC BMI ABV UP PARAM F/U: HCPCS | Performed by: NURSE PRACTITIONER

## 2019-09-17 PROCEDURE — 4004F PT TOBACCO SCREEN RCVD TLK: CPT | Performed by: NURSE PRACTITIONER

## 2019-09-17 PROCEDURE — 99215 OFFICE O/P EST HI 40 MIN: CPT | Performed by: NURSE PRACTITIONER

## 2019-09-17 PROCEDURE — G8427 DOCREV CUR MEDS BY ELIG CLIN: HCPCS | Performed by: NURSE PRACTITIONER

## 2019-09-17 RX ORDER — OCTISALATE, AVOBENZONE, HOMOSALATE, AND OCTOCRYLENE 29.4; 29.4; 49; 25.48 MG/ML; MG/ML; MG/ML; MG/ML
1 LOTION TOPICAL ONCE
Qty: 30 CAPSULE | Refills: 3 | Status: SHIPPED | OUTPATIENT
Start: 2019-09-17 | End: 2019-09-17

## 2019-09-17 RX ORDER — OMEPRAZOLE 10 MG/1
10 CAPSULE, DELAYED RELEASE ORAL DAILY
COMMUNITY
End: 2020-04-06 | Stop reason: DRUGHIGH

## 2019-09-17 RX ORDER — DICYCLOMINE HYDROCHLORIDE 10 MG/1
10 CAPSULE ORAL
COMMUNITY
End: 2019-09-17 | Stop reason: SDUPTHER

## 2019-09-17 RX ORDER — DICYCLOMINE HYDROCHLORIDE 10 MG/1
10 CAPSULE ORAL
Qty: 80 CAPSULE | Refills: 1 | Status: SHIPPED | OUTPATIENT
Start: 2019-09-17 | End: 2020-04-06 | Stop reason: SDUPTHER

## 2019-09-17 ASSESSMENT — ENCOUNTER SYMPTOMS
DIARRHEA: 0
EYE PAIN: 0
COUGH: 0
SHORTNESS OF BREATH: 0
BLOOD IN STOOL: 0
ABDOMINAL PAIN: 0
CONSTIPATION: 0
NAUSEA: 0
VOMITING: 0
WHEEZING: 0
BACK PAIN: 0

## 2019-09-17 NOTE — PROGRESS NOTES
Cholecystectomy; Dental surgery; Endoscopy, colon, diagnostic (01/30/2018); Colonoscopy (01/30/2018); Colonoscopy (05/22/2019); and Colonoscopy (N/A, 5/22/2019). Social History:  She reports that she has been smoking cigarettes. She has a 10.00 pack-year smoking history. She has never used smokeless tobacco. She reports that she drinks alcohol. She reports that she has current or past drug history. Drug: Marijuana. Family history:  Her family history includes Diabetes in her maternal grandmother and mother. Objective    Vitals:    09/17/19 1445   BP: 126/80   Pulse: 87   SpO2: 98%        Physical exam    Physical Exam   Constitutional: She is oriented to person, place, and time. She appears well-developed and well-nourished. HENT:   Head: Normocephalic and atraumatic. Eyes: Pupils are equal, round, and reactive to light. Conjunctivae and EOM are normal.   Neck: Normal range of motion. Neck supple. No JVD present. No tracheal deviation present. No thyromegaly present. Cardiovascular: Normal rate, regular rhythm, normal heart sounds and intact distal pulses. Exam reveals no gallop and no friction rub. No murmur heard. Pulmonary/Chest: Effort normal and breath sounds normal. No stridor. No respiratory distress. She has no wheezes. She has no rales. She exhibits no tenderness. Abdominal: Soft. Bowel sounds are normal. She exhibits no distension and no mass. There is tenderness (mild upper abdomen). There is no rebound and no guarding. Morbid obese   Musculoskeletal: Normal range of motion. Lymphadenopathy:     She has no cervical adenopathy. Neurological: She is alert and oriented to person, place, and time. Skin: Skin is warm and dry. Psychiatric: She has a normal mood and affect. Vitals reviewed. No visits with results within 2 Month(s) from this visit.    Latest known visit with results is:   Hospital Outpatient Visit on 05/30/2019   Component Date Value Ref Range Status    WBC 05/30/2019 10.2  4.0 - 10.5 K/CU MM Final    RBC 05/30/2019 4.92  4.2 - 5.4 M/CU MM Final    Hemoglobin 05/30/2019 14.2  12.5 - 16.0 GM/DL Final    Hematocrit 05/30/2019 44.9  37 - 47 % Final    MCV 05/30/2019 91.3  78 - 100 FL Final    MCH 05/30/2019 28.9  27 - 31 PG Final    MCHC 05/30/2019 31.6* 32.0 - 36.0 % Final    RDW 05/30/2019 12.8  11.7 - 14.9 % Final    Platelets 61/10/5758 331  140 - 440 K/CU MM Final    MPV 05/30/2019 11.6* 7.5 - 11.1 FL Final    Differential Type 05/30/2019 AUTOMATED DIFFERENTIAL   Final    Segs Relative 05/30/2019 60.6  36 - 66 % Final    Lymphocytes % 05/30/2019 31.8  24 - 44 % Final    Monocytes % 05/30/2019 5.4* 0 - 4 % Final    Eosinophils % 05/30/2019 1.5  0 - 3 % Final    Basophils % 05/30/2019 0.4  0 - 1 % Final    Segs Absolute 05/30/2019 6.2  K/CU MM Final    Lymphocytes Absolute 05/30/2019 3.3  K/CU MM Final    Monocytes Absolute 05/30/2019 0.6  K/CU MM Final    Eosinophils Absolute 05/30/2019 0.2  K/CU MM Final    Basophils Absolute 05/30/2019 0.0  K/CU MM Final    Nucleated RBC % 05/30/2019 0.0  % Final    Total Nucleated RBC 05/30/2019 0.0  K/CU MM Final    Total Immature Neutrophil 05/30/2019 0.03  K/CU MM Final    Immature Neutrophil % 05/30/2019 0.3  0 - 0.43 % Final       Assessment    ICD-10-CM    1. Altered bowel habits R19.4    2. Class 3 severe obesity due to excess calories without serious comorbidity with body mass index (BMI) of 45.0 to 49.9 in adult (HCC) E66.01 SRMZ Diet and Nutrition    Z68.42    3. Gastroesophageal reflux disease without esophagitis K21.9    4. History of colitis Z87.19    5. Abnormal finding on CT scan R93.89              Plan    1. Altered bowel habit most likely due to functional bowel disease such as irritable bowel syndrome. Will place referral to dietician for diet modification. Recommend avoiding foods that trigger change in bowels.   The patient was provided with information on irritable bowel syndrome and

## 2019-09-17 NOTE — PATIENT INSTRUCTIONS
Patient Education        dicyclomine  Pronunciation:  charan rudd rafael  Brand:  Charmaine  What is the most important information I should know about dicyclomine? This medication may impair your thinking or reactions. Be careful if you drive or do anything that requires you to be alert. Drinking alcohol can increase certain side effects of dicyclomine. Avoid becoming overheated or dehydrated during exercise and in hot weather. Dicyclomine can decrease your sweating, which can lead to heat stroke in a hot environment. Stop using dicyclomine and call your doctor right away if you have serious side effects such as confusion, hallucinations, unusual thoughts or behavior, fast or uneven heart rate, or if you urinate less than usual or not at all. There are many other medicines that can interact with dicyclomine. Tell your doctor about all the prescription and over-the-counter medications you use. This includes vitamins, minerals, herbal products, and drugs prescribed by other doctors. Do not start using a new medication without telling your doctor. Keep a list with you of all the medicines you use and show this list to any doctor or other healthcare provider who treats you. What is dicyclomine? Dicyclomine relieves spasms of the muscles in the stomach and intestines. Dicyclomine is used to treat functional bowel or irritable bowel syndrome. Dicyclomine may also be used for purposes not listed in this medication guide. What should I discuss with my healthcare provider before taking dicyclomine? You should not take this medication if you are allergic to dicyclomine, or if you have:  · problems with urination;  · a bowel obstruction or severe constipation;  · severe ulcerative colitis or toxic megacolon;  · gastroesophageal reflux disease (GERD);  · a serious heart condition or active bleeding;  · glaucoma;  · myasthenia gravis; or  · if you are breast-feeding a baby.   To make sure you can safely take dicyclomine, tell your doctor if you have any of these other conditions:  · ulcerative colitis;  · an ileostomy or colostomy;  · a nerve problem (such as numbness or tingling);  · liver or kidney disease;  · heart disease, congestive heart failure, high blood pressure, or a heart rhythm disorder;  · hiatal hernia; or  · an enlarged prostate. FDA pregnancy category B. This medication is not expected to be harmful to an unborn baby. Tell your doctor if you are pregnant or plan to become pregnant during treatment. Dicyclomine can pass into breast milk and can cause breathing problems or other life-threatening side effects in infants younger than 10months of age. Do not breast feed a baby while taking this medication. Older adults may be more likely to have side effects from this medicine. Dicyclomine should not be given to a child younger than 7 months old. How should I take dicyclomine? Take exactly as prescribed by your doctor. Do not take in larger or smaller amounts or for longer than recommended. Follow the directions on your prescription label. Dicyclomine is usually taken 4 times each day. Your doctor may occasionally change your dose to make sure you get the best results. Take this medicine with a full glass of water. Measure liquid medicine with a special dose-measuring spoon or cup, not a regular table spoon. If you do not have a dose-measuring device, ask your pharmacist for one. Talk with your doctor if your symptoms do not improve after 2 weeks of treatment. Store at room temperature away from moisture and heat. What happens if I miss a dose? Take the missed dose as soon as you remember. Skip the missed dose if it is almost time for your next scheduled dose. Do not take extra medicine to make up the missed dose. What happens if I overdose? Seek emergency medical attention or call the Poison Help line at 1-608.390.8231.   Overdose symptoms may include nausea, vomiting, dilated pupils, weakness

## 2019-09-18 ASSESSMENT — ENCOUNTER SYMPTOMS: COLOR CHANGE: 0

## 2019-11-12 ENCOUNTER — TELEPHONE (OUTPATIENT)
Dept: GASTROENTEROLOGY | Age: 39
End: 2019-11-12

## 2019-11-12 ENCOUNTER — HOSPITAL ENCOUNTER (OUTPATIENT)
Dept: DIABETES SERVICES | Age: 39
Setting detail: THERAPIES SERIES
Discharge: HOME | End: 2019-11-12
Payer: COMMERCIAL

## 2019-11-12 PROCEDURE — 97802 MEDICAL NUTRITION INDIV IN: CPT

## 2019-11-13 ENCOUNTER — OFFICE VISIT (OUTPATIENT)
Dept: FAMILY MEDICINE CLINIC | Age: 39
End: 2019-11-13
Payer: COMMERCIAL

## 2019-11-13 VITALS
BODY MASS INDEX: 46.03 KG/M2 | SYSTOLIC BLOOD PRESSURE: 124 MMHG | OXYGEN SATURATION: 98 % | DIASTOLIC BLOOD PRESSURE: 68 MMHG | HEART RATE: 93 BPM | TEMPERATURE: 95.6 F | WEIGHT: 276.6 LBS

## 2019-11-13 DIAGNOSIS — Z13.6 SCREENING FOR CARDIOVASCULAR CONDITION: ICD-10-CM

## 2019-11-13 DIAGNOSIS — Z13.1 SCREENING FOR DIABETES MELLITUS: ICD-10-CM

## 2019-11-13 DIAGNOSIS — J01.90 ACUTE BACTERIAL SINUSITIS: Primary | ICD-10-CM

## 2019-11-13 DIAGNOSIS — E03.9 ACQUIRED HYPOTHYROIDISM: ICD-10-CM

## 2019-11-13 DIAGNOSIS — B96.89 ACUTE BACTERIAL SINUSITIS: Primary | ICD-10-CM

## 2019-11-13 PROCEDURE — G8417 CALC BMI ABV UP PARAM F/U: HCPCS | Performed by: FAMILY MEDICINE

## 2019-11-13 PROCEDURE — 4004F PT TOBACCO SCREEN RCVD TLK: CPT | Performed by: FAMILY MEDICINE

## 2019-11-13 PROCEDURE — G8484 FLU IMMUNIZE NO ADMIN: HCPCS | Performed by: FAMILY MEDICINE

## 2019-11-13 PROCEDURE — G8427 DOCREV CUR MEDS BY ELIG CLIN: HCPCS | Performed by: FAMILY MEDICINE

## 2019-11-13 PROCEDURE — 99214 OFFICE O/P EST MOD 30 MIN: CPT | Performed by: FAMILY MEDICINE

## 2019-11-13 RX ORDER — BENZONATATE 200 MG/1
200 CAPSULE ORAL 3 TIMES DAILY PRN
Qty: 30 CAPSULE | Refills: 0 | Status: SHIPPED | OUTPATIENT
Start: 2019-11-13

## 2019-11-13 RX ORDER — AMOXICILLIN 500 MG/1
500 CAPSULE ORAL 2 TIMES DAILY
Qty: 14 CAPSULE | Refills: 0 | Status: SHIPPED | OUTPATIENT
Start: 2019-11-13 | End: 2019-11-20

## 2019-11-13 RX ORDER — DEXTROMETHORPHAN HYDROBROMIDE AND PROMETHAZINE HYDROCHLORIDE 15; 6.25 MG/5ML; MG/5ML
5 SYRUP ORAL 4 TIMES DAILY PRN
Qty: 240 ML | Refills: 0 | Status: SHIPPED | OUTPATIENT
Start: 2019-11-13

## 2019-11-13 ASSESSMENT — PATIENT HEALTH QUESTIONNAIRE - PHQ9
SUM OF ALL RESPONSES TO PHQ9 QUESTIONS 1 & 2: 0
1. LITTLE INTEREST OR PLEASURE IN DOING THINGS: 0
SUM OF ALL RESPONSES TO PHQ QUESTIONS 1-9: 0
SUM OF ALL RESPONSES TO PHQ QUESTIONS 1-9: 0
2. FEELING DOWN, DEPRESSED OR HOPELESS: 0

## 2019-11-14 DIAGNOSIS — E03.9 ACQUIRED HYPOTHYROIDISM: Primary | ICD-10-CM

## 2019-11-14 LAB
A/G RATIO: 1.7 (CALC) (ref 0.8–2.6)
ALBUMIN SERPL-MCNC: 4.3 GM/DL (ref 3.5–5.2)
ALP BLD-CCNC: 95 U/L (ref 23–144)
ALT SERPL-CCNC: 27 U/L (ref 0–60)
AST SERPL-CCNC: 21 U/L (ref 0–46)
BILIRUB SERPL-MCNC: 0.3 MG/DL (ref 0–1.2)
BUN / CREAT RATIO: 13 (CALC) (ref 7–25)
BUN BLDV-MCNC: 9 MG/DL (ref 3–29)
CALCIUM SERPL-MCNC: 9.9 MG/DL (ref 8.5–10.5)
CHLORIDE BLD-SCNC: 106 MEQ/L (ref 96–110)
CHOLESTEROL, TOTAL: 230 MG/DL
CO2: 22 MEQ/L (ref 19–32)
CREAT SERPL-MCNC: 0.7 MG/DL
GFR SERPL CREATININE-BSD FRML MDRD: 111 ML/MIN/1.73M2
GLOBULIN: 2.6 GM/DL (CALC) (ref 1.9–3.6)
GLUCOSE BLD-MCNC: 127 MG/DL
HDLC SERPL-MCNC: 29 MG/DL
LDL CHOLESTEROL: ABNORMAL MG/DL (CALC)
POTASSIUM SERPL-SCNC: 4.2 MEQ/L (ref 3.4–5.3)
SODIUM BLD-SCNC: 140 MEQ/L (ref 135–148)
TOTAL PROTEIN: 6.9 GM/DL (ref 6–8.3)
TRIGL SERPL-MCNC: 491 MG/DL
TSH SERPL DL<=0.05 MIU/L-ACNC: 4.66 MICRO IU/ML (ref 0.4–4.5)
VLDLC SERPL CALC-MCNC: ABNORMAL MG/DL (CALC) (ref 4–30)

## 2019-11-14 RX ORDER — LEVOTHYROXINE SODIUM 0.05 MG/1
50 TABLET ORAL DAILY
Qty: 30 TABLET | Refills: 1 | Status: SHIPPED | OUTPATIENT
Start: 2019-11-14 | End: 2019-12-30 | Stop reason: SDUPTHER

## 2019-11-21 ENCOUNTER — TELEPHONE (OUTPATIENT)
Dept: FAMILY MEDICINE CLINIC | Age: 39
End: 2019-11-21

## 2019-12-19 ENCOUNTER — OFFICE VISIT (OUTPATIENT)
Dept: GASTROENTEROLOGY | Age: 39
End: 2019-12-19
Payer: COMMERCIAL

## 2019-12-19 VITALS
HEIGHT: 65 IN | DIASTOLIC BLOOD PRESSURE: 82 MMHG | HEART RATE: 83 BPM | BODY MASS INDEX: 45.98 KG/M2 | OXYGEN SATURATION: 94 % | WEIGHT: 276 LBS | SYSTOLIC BLOOD PRESSURE: 126 MMHG

## 2019-12-19 DIAGNOSIS — Z87.19 HISTORY OF COLITIS: ICD-10-CM

## 2019-12-19 DIAGNOSIS — K21.9 GASTROESOPHAGEAL REFLUX DISEASE WITHOUT ESOPHAGITIS: ICD-10-CM

## 2019-12-19 DIAGNOSIS — R19.5 LOOSE STOOLS: Primary | ICD-10-CM

## 2019-12-19 PROCEDURE — G8484 FLU IMMUNIZE NO ADMIN: HCPCS | Performed by: NURSE PRACTITIONER

## 2019-12-19 PROCEDURE — G8427 DOCREV CUR MEDS BY ELIG CLIN: HCPCS | Performed by: NURSE PRACTITIONER

## 2019-12-19 PROCEDURE — 4004F PT TOBACCO SCREEN RCVD TLK: CPT | Performed by: NURSE PRACTITIONER

## 2019-12-19 PROCEDURE — 99214 OFFICE O/P EST MOD 30 MIN: CPT | Performed by: NURSE PRACTITIONER

## 2019-12-19 PROCEDURE — G8417 CALC BMI ABV UP PARAM F/U: HCPCS | Performed by: NURSE PRACTITIONER

## 2019-12-19 RX ORDER — FAMOTIDINE 40 MG/1
40 TABLET, FILM COATED ORAL EVERY EVENING
Qty: 30 TABLET | Refills: 3 | Status: SHIPPED | OUTPATIENT
Start: 2019-12-19 | End: 2020-04-06 | Stop reason: SDUPTHER

## 2019-12-19 RX ORDER — OCTISALATE, AVOBENZONE, HOMOSALATE, AND OCTOCRYLENE 29.4; 29.4; 49; 25.48 MG/ML; MG/ML; MG/ML; MG/ML
1 LOTION TOPICAL DAILY
Qty: 30 CAPSULE | Refills: 3 | Status: SHIPPED | OUTPATIENT
Start: 2019-12-19

## 2019-12-19 ASSESSMENT — ENCOUNTER SYMPTOMS
EYE PAIN: 0
ABDOMINAL PAIN: 0
VOMITING: 0
WHEEZING: 0
BLOOD IN STOOL: 0
CONSTIPATION: 0
SHORTNESS OF BREATH: 0
NAUSEA: 0

## 2019-12-20 ASSESSMENT — ENCOUNTER SYMPTOMS
COLOR CHANGE: 0
BACK PAIN: 1
COUGH: 1
DIARRHEA: 1

## 2019-12-27 ENCOUNTER — NURSE ONLY (OUTPATIENT)
Dept: FAMILY MEDICINE CLINIC | Age: 39
End: 2019-12-27

## 2019-12-27 DIAGNOSIS — E03.9 ACQUIRED HYPOTHYROIDISM: ICD-10-CM

## 2019-12-28 LAB — TSH SERPL DL<=0.05 MIU/L-ACNC: 3.55 MICRO IU/ML (ref 0.4–4.5)

## 2019-12-30 ENCOUNTER — TELEPHONE (OUTPATIENT)
Dept: GASTROENTEROLOGY | Age: 39
End: 2019-12-30

## 2019-12-30 DIAGNOSIS — E03.9 ACQUIRED HYPOTHYROIDISM: ICD-10-CM

## 2019-12-30 RX ORDER — LEVOTHYROXINE SODIUM 0.05 MG/1
50 TABLET ORAL DAILY
Qty: 30 TABLET | Refills: 5 | Status: SHIPPED | OUTPATIENT
Start: 2019-12-30

## 2020-04-06 ENCOUNTER — VIRTUAL VISIT (OUTPATIENT)
Dept: GASTROENTEROLOGY | Age: 40
End: 2020-04-06
Payer: COMMERCIAL

## 2020-04-06 PROCEDURE — 4004F PT TOBACCO SCREEN RCVD TLK: CPT | Performed by: NURSE PRACTITIONER

## 2020-04-06 PROCEDURE — 99214 OFFICE O/P EST MOD 30 MIN: CPT | Performed by: NURSE PRACTITIONER

## 2020-04-06 PROCEDURE — G8428 CUR MEDS NOT DOCUMENT: HCPCS | Performed by: NURSE PRACTITIONER

## 2020-04-06 PROCEDURE — G8417 CALC BMI ABV UP PARAM F/U: HCPCS | Performed by: NURSE PRACTITIONER

## 2020-04-06 RX ORDER — OMEPRAZOLE 20 MG/1
20 CAPSULE, DELAYED RELEASE ORAL DAILY
Qty: 30 CAPSULE | Refills: 3 | Status: SHIPPED | OUTPATIENT
Start: 2020-04-06 | End: 2021-01-14 | Stop reason: SDUPTHER

## 2020-04-06 RX ORDER — SELENIUM 50 MCG
1 TABLET ORAL DAILY
Qty: 30 CAPSULE | Refills: 3 | Status: SHIPPED | OUTPATIENT
Start: 2020-04-06 | End: 2021-03-01 | Stop reason: SDUPTHER

## 2020-04-06 RX ORDER — FAMOTIDINE 40 MG/1
40 TABLET, FILM COATED ORAL EVERY EVENING
Qty: 30 TABLET | Refills: 3 | Status: SHIPPED | OUTPATIENT
Start: 2020-04-06 | End: 2021-01-14 | Stop reason: SDUPTHER

## 2020-04-06 RX ORDER — DICYCLOMINE HYDROCHLORIDE 10 MG/1
10 CAPSULE ORAL
Qty: 80 CAPSULE | Refills: 1 | Status: SHIPPED | OUTPATIENT
Start: 2020-04-06 | End: 2021-01-14 | Stop reason: SDUPTHER

## 2020-04-06 NOTE — PROGRESS NOTES
Negative for pain. Respiratory: Positive for cough. Negative for shortness of breath and wheezing. Cardiovascular: Positive for leg swelling. Negative for chest pain and palpitations. Gastrointestinal: Positive for diarrhea. Negative for abdominal pain, blood in stool, constipation, nausea and vomiting. Endocrine: Negative for cold intolerance and heat intolerance. Genitourinary: Negative for dysuria, frequency and urgency. Musculoskeletal: Positive for back pain. Negative for myalgias and neck pain. Skin: Negative for color change, pallor and rash. Allergic/Immunologic: Negative for environmental allergies and food allergies. Neurological: Positive for headaches. Negative for dizziness and seizures. Hematological: Does not bruise/bleed easily. Psychiatric/Behavioral: Negative for dysphoric mood, sleep disturbance and suicidal ideas. The patient is not nervous/anxious.       Physical exam     Physical Exam  Vitals signs reviewed. Constitutional:       General: She is not in acute distress. Pulmonary:      Effort: Pulmonary effort is normal. No respiratory distress. Musculoskeletal: Normal range of motion. Neurological:      Mental Status: She is alert and oriented to person, place, and time. Psychiatric:         Mood and Affect: Mood normal.          Total Time: 25 minutes    Plan    1.  Loose stools that is non-worsening most likely due to functional bowel disease such as irritable bowel syndrome, diet or medication related. The patient was encouraged to take Bentyl as needed for abdominal cramping and loose stools. Discussed side effects of drowsiness, dry mouth and constipation. Will order Probiotic take once daily for improved digestive wellness.   The patient was encouraged to avoid inflammatory foods and follow dietician recommendations.      2.  Abdominal pain with abnormal finding on CTA and history of colitis.  She denies current abdominal pain has lessened to every other month. 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  Recommend smoking cessation, weight loss and follow low cholesterol/low sodium diet.  Recommend following up with Dr. Esther Ho as indicated. Recommend following with OBGYN for follow-up on ovarian cyst and irregular menstrual cycle. 3.  GERD without dysphagia or odynophagia that is worsening in day time.  Will order Prilosec 20 mg take once daily in the morning and Pepcid 40 mg take daily in the evening.  Recommend anti-reflux measures, avoid eating late at night, avoid trigger foods and weight loss. 4.  The patient was encouraged to follow-up in 3 months.     Lily Dai

## 2020-04-08 ENCOUNTER — TELEPHONE (OUTPATIENT)
Dept: GASTROENTEROLOGY | Age: 40
End: 2020-04-08

## 2020-04-13 NOTE — TELEPHONE ENCOUNTER
Called and spoke to Bairon Thorpe, scheduled 3 month F/U on 7/13/20 at 1415. Bairon Thorpe wanted to check to see if Probiotic was sent in. On 4/6/20 4 Scripts were sent to [facility]. She will call back after she checks with the pharmacy.

## 2021-01-14 ENCOUNTER — VIRTUAL VISIT (OUTPATIENT)
Dept: GASTROENTEROLOGY | Age: 41
End: 2021-01-14

## 2021-01-14 DIAGNOSIS — R10.31 RIGHT LOWER QUADRANT ABDOMINAL PAIN: Primary | ICD-10-CM

## 2021-01-14 DIAGNOSIS — R14.0 BLOATING: ICD-10-CM

## 2021-01-14 DIAGNOSIS — R19.7 DIARRHEA, UNSPECIFIED TYPE: ICD-10-CM

## 2021-01-14 DIAGNOSIS — K21.9 GASTROESOPHAGEAL REFLUX DISEASE WITHOUT ESOPHAGITIS: ICD-10-CM

## 2021-01-14 PROCEDURE — G8421 BMI NOT CALCULATED: HCPCS | Performed by: NURSE PRACTITIONER

## 2021-01-14 PROCEDURE — G8484 FLU IMMUNIZE NO ADMIN: HCPCS | Performed by: NURSE PRACTITIONER

## 2021-01-14 PROCEDURE — 4004F PT TOBACCO SCREEN RCVD TLK: CPT | Performed by: NURSE PRACTITIONER

## 2021-01-14 PROCEDURE — G8427 DOCREV CUR MEDS BY ELIG CLIN: HCPCS | Performed by: NURSE PRACTITIONER

## 2021-01-14 PROCEDURE — 99212 OFFICE O/P EST SF 10 MIN: CPT | Performed by: NURSE PRACTITIONER

## 2021-01-14 RX ORDER — DICYCLOMINE HYDROCHLORIDE 10 MG/1
10 CAPSULE ORAL
Qty: 80 CAPSULE | Refills: 1 | Status: SHIPPED | OUTPATIENT
Start: 2021-01-14 | End: 2021-03-01 | Stop reason: SDUPTHER

## 2021-01-14 RX ORDER — FAMOTIDINE 40 MG/1
40 TABLET, FILM COATED ORAL EVERY EVENING
Qty: 30 TABLET | Refills: 3 | Status: SHIPPED | OUTPATIENT
Start: 2021-01-14 | End: 2021-03-01 | Stop reason: SDUPTHER

## 2021-01-14 RX ORDER — OMEPRAZOLE 20 MG/1
20 CAPSULE, DELAYED RELEASE ORAL DAILY
Qty: 30 CAPSULE | Refills: 3 | Status: SHIPPED | OUTPATIENT
Start: 2021-01-14 | End: 2021-03-01 | Stop reason: SDUPTHER

## 2021-01-15 ENCOUNTER — HOSPITAL ENCOUNTER (OUTPATIENT)
Age: 41
Discharge: HOME | End: 2021-01-15
Payer: COMMERCIAL

## 2021-01-15 ENCOUNTER — TELEPHONE (OUTPATIENT)
Dept: GASTROENTEROLOGY | Age: 41
End: 2021-01-15

## 2021-01-15 LAB
ALBUMIN SERPL-MCNC: 4 GM/DL (ref 3.4–5)
ALP BLD-CCNC: 73 IU/L (ref 40–128)
ALT SERPL-CCNC: 23 U/L (ref 10–40)
ANION GAP SERPL CALCULATED.3IONS-SCNC: 9 MMOL/L (ref 4–16)
AST SERPL-CCNC: 23 IU/L (ref 15–37)
BASOPHILS ABSOLUTE: 0 K/CU MM
BASOPHILS RELATIVE PERCENT: 0.2 % (ref 0–1)
BILIRUB SERPL-MCNC: 0.2 MG/DL (ref 0–1)
BUN BLDV-MCNC: 11 MG/DL (ref 6–23)
CALCIUM SERPL-MCNC: 8.8 MG/DL (ref 8.3–10.6)
CHLORIDE BLD-SCNC: 106 MMOL/L (ref 99–110)
CO2: 25 MMOL/L (ref 21–32)
CREAT SERPL-MCNC: 0.8 MG/DL (ref 0.6–1.1)
DIFFERENTIAL TYPE: ABNORMAL
EOSINOPHILS ABSOLUTE: 0.2 K/CU MM
EOSINOPHILS RELATIVE PERCENT: 1.7 % (ref 0–3)
GFR AFRICAN AMERICAN: >60 ML/MIN/1.73M2
GFR NON-AFRICAN AMERICAN: >60 ML/MIN/1.73M2
GLUCOSE BLD-MCNC: 105 MG/DL (ref 70–99)
HCT VFR BLD CALC: 47 % (ref 37–47)
HEMOGLOBIN: 15.2 GM/DL (ref 12.5–16)
IMMATURE NEUTROPHIL %: 0.3 % (ref 0–0.43)
LYMPHOCYTES ABSOLUTE: 3.5 K/CU MM
LYMPHOCYTES RELATIVE PERCENT: 31 % (ref 24–44)
MCH RBC QN AUTO: 29.9 PG (ref 27–31)
MCHC RBC AUTO-ENTMCNC: 32.3 % (ref 32–36)
MCV RBC AUTO: 92.3 FL (ref 78–100)
MONOCYTES ABSOLUTE: 0.6 K/CU MM
MONOCYTES RELATIVE PERCENT: 5.1 % (ref 0–4)
NUCLEATED RBC %: 0 %
PDW BLD-RTO: 12.4 % (ref 11.7–14.9)
PLATELET # BLD: 310 K/CU MM (ref 140–440)
PMV BLD AUTO: 10.7 FL (ref 7.5–11.1)
POTASSIUM SERPL-SCNC: 4.4 MMOL/L (ref 3.5–5.1)
RBC # BLD: 5.09 M/CU MM (ref 4.2–5.4)
SEGMENTED NEUTROPHILS ABSOLUTE COUNT: 7.1 K/CU MM
SEGMENTED NEUTROPHILS RELATIVE PERCENT: 61.7 % (ref 36–66)
SODIUM BLD-SCNC: 140 MMOL/L (ref 135–145)
TOTAL IMMATURE NEUTOROPHIL: 0.03 K/CU MM
TOTAL NUCLEATED RBC: 0 K/CU MM
TOTAL PROTEIN: 6.8 GM/DL (ref 6.4–8.2)
WBC # BLD: 11.4 K/CU MM (ref 4–10.5)

## 2021-01-15 PROCEDURE — 80053 COMPREHEN METABOLIC PANEL: CPT

## 2021-01-15 PROCEDURE — 85025 COMPLETE CBC W/AUTO DIFF WBC: CPT

## 2021-01-15 PROCEDURE — 36415 COLL VENOUS BLD VENIPUNCTURE: CPT

## 2021-01-15 NOTE — PROGRESS NOTES
Martine Cantor is a 47 y.o. female evaluated via telephone on 1/14/2021. Consent:  She is aware that that she may receive a bill for this telephone service, depending on her insurance coverage, and has provided verbal consent to proceed: Yes she agreed to the telephone visit. Documentation:  I communicated with the patient about follow-up on abdominal pain, acid reflux, loose stools and history of colitis. Her appetite is good and weight is stable. Her abdominal pain is described as a 7/10 cramping to pressure like pain to her right lower quadrant with some radiation to her  lower back. Her pain has been ongoing for the last couple days. She mentioned eating certain foods like peanuts and seafood worsened her pain. Her pain has improved a little with eating a bland diet. She has mild nausea. No vomiting. Her heartburn and acid reflux is ongoing. She ran out of Prilosec in July and since then having worsening acid reflux. No nocturnal awakenings with acid reflux. No dysphagia or pain with swallowing. No excess belching or flatulence.  Her typical bowel pattern is three to four times daily with soft mushy brown stools. She mentioned having some liquids stools five to seven times a day. No constipation. No blood in her stools or black tarry stools. No family history of stomach or colon cancer    ROS  Review of Systems   Constitutional: Negative for appetite change, chills, fatigue, fever and unexpected weight change. HENT: Negative for ear pain, hearing loss and tinnitus.    Eyes: Positive for visual disturbance. Negative for pain. Respiratory: Positive for cough. Negative for shortness of breath and wheezing.    Cardiovascular: Positive for leg swelling. Negative for chest pain and palpitations. Gastrointestinal: Positive for abdominal pain and diarrhea. Negative for blood in stool, constipation, nausea and vomiting. Endocrine: Negative for cold intolerance and heat intolerance. Genitourinary: Negative for dysuria, frequency and urgency. Musculoskeletal: Positive for back pain. Negative for myalgias and neck pain. Skin: Negative for color change, pallor and rash. Allergic/Immunologic: Negative for environmental allergies and food allergies. Neurological: Positive for headaches. Negative for dizziness and seizures. Hematological: Does not bruise/bleed easily. Psychiatric/Behavioral: Negative for dysphoric mood, sleep disturbance and suicidal ideas. The patient is not nervous/anxious.          Physical Exam  Constitutional:       General: She is not in acute distress.         Pulmonary:      Effort: Pulmonary effort is normal. No respiratory distress.         Musculoskeletal: Normal range of motion. Neurological:      Mental Status: She is alert and oriented to person, place, and time. Psychiatric:         Mood and Affect: Mood normal.       Assessment and Plan:     1.  Abdominal pain to lower abdomen most likely due to irritable bowel syndrome. Will order CT of the abdomen/pelvis to rule out diverticulitis, appendicitis or colitis. The patient was encouraged to eat a bland diet that is low in fat. The patient was encouraged to take Bentyl as needed. 2.  Loose stools with mix of diarrhea most likely due to diet, functional bowel disease such as irritable bowel syndrome, diet or medication related.  The patient was encouraged to take Bentyl as needed for abdominal cramping and loose stools. Discussed side effects of drowsiness, dry mouth and constipation. The patient was encouraged to continue taking Probiotic.  The patient was encouraged to avoid inflammatory foods and follow dietician recommendations.     3.  GERD without dysphagia or odynophagia that is worsening in day time.  Will reorder Prilosec 20 mg take once daily in the morning and Pepcid 40 mg take daily in the evening.  The patient was encouraged to continue with anti-reflux measures, avoid eating late at night, avoid trigger foods and weight loss. 4.  The patient was encouraged to follow-up in 3 months.        I affirm this is a Patient Initiated Episode with a Patient who has not had a related appointment within my department in the past 7 days or scheduled within the next 24 hours. Patient identification was verified at the start of the visit: Yes she verified her full name and date of birth. Total Time: 15 minutes.     Note: not billable if this call serves to triage the patient into an appointment for the relevant concern      Sharif Darden

## 2021-02-22 ENCOUNTER — HOSPITAL ENCOUNTER (OUTPATIENT)
Dept: CT IMAGING | Age: 41
Discharge: HOME | End: 2021-02-22
Payer: COMMERCIAL

## 2021-02-22 DIAGNOSIS — R10.31 RIGHT LOWER QUADRANT ABDOMINAL PAIN: ICD-10-CM

## 2021-02-22 DIAGNOSIS — R14.0 BLOATING: ICD-10-CM

## 2021-02-22 PROCEDURE — 2580000003 HC RX 258: Performed by: NURSE PRACTITIONER

## 2021-02-22 PROCEDURE — 74177 CT ABD & PELVIS W/CONTRAST: CPT

## 2021-02-22 PROCEDURE — 6360000004 HC RX CONTRAST MEDICATION: Performed by: NURSE PRACTITIONER

## 2021-02-22 RX ORDER — SODIUM CHLORIDE 0.9 % (FLUSH) 0.9 %
10 SYRINGE (ML) INJECTION PRN
Status: DISCONTINUED | OUTPATIENT
Start: 2021-02-22 | End: 2021-02-23 | Stop reason: HOSPADM

## 2021-02-22 RX ADMIN — SODIUM CHLORIDE, PRESERVATIVE FREE 10 ML: 5 INJECTION INTRAVENOUS at 10:25

## 2021-02-22 RX ADMIN — IOPAMIDOL 75 ML: 755 INJECTION, SOLUTION INTRAVENOUS at 10:25

## 2021-02-22 RX ADMIN — IOHEXOL 50 ML: 240 INJECTION, SOLUTION INTRATHECAL; INTRAVASCULAR; INTRAVENOUS; ORAL at 08:45

## 2021-03-01 ENCOUNTER — OFFICE VISIT (OUTPATIENT)
Dept: GASTROENTEROLOGY | Age: 41
End: 2021-03-01
Payer: COMMERCIAL

## 2021-03-01 VITALS
HEIGHT: 65 IN | TEMPERATURE: 98 F | BODY MASS INDEX: 45.35 KG/M2 | WEIGHT: 272.2 LBS | OXYGEN SATURATION: 97 % | SYSTOLIC BLOOD PRESSURE: 140 MMHG | DIASTOLIC BLOOD PRESSURE: 90 MMHG | RESPIRATION RATE: 16 BRPM | HEART RATE: 83 BPM

## 2021-03-01 DIAGNOSIS — R19.5 LOOSE STOOLS: ICD-10-CM

## 2021-03-01 DIAGNOSIS — R10.9 ABDOMINAL DISCOMFORT: Primary | ICD-10-CM

## 2021-03-01 DIAGNOSIS — K21.9 GASTROESOPHAGEAL REFLUX DISEASE WITHOUT ESOPHAGITIS: ICD-10-CM

## 2021-03-01 DIAGNOSIS — Z87.19 HISTORY OF COLITIS: ICD-10-CM

## 2021-03-01 PROCEDURE — G8427 DOCREV CUR MEDS BY ELIG CLIN: HCPCS | Performed by: NURSE PRACTITIONER

## 2021-03-01 PROCEDURE — G8484 FLU IMMUNIZE NO ADMIN: HCPCS | Performed by: NURSE PRACTITIONER

## 2021-03-01 PROCEDURE — 4004F PT TOBACCO SCREEN RCVD TLK: CPT | Performed by: NURSE PRACTITIONER

## 2021-03-01 PROCEDURE — G8417 CALC BMI ABV UP PARAM F/U: HCPCS | Performed by: NURSE PRACTITIONER

## 2021-03-01 PROCEDURE — 99212 OFFICE O/P EST SF 10 MIN: CPT | Performed by: NURSE PRACTITIONER

## 2021-03-01 RX ORDER — OMEPRAZOLE 20 MG/1
20 CAPSULE, DELAYED RELEASE ORAL DAILY
Qty: 30 CAPSULE | Refills: 3 | Status: SHIPPED | OUTPATIENT
Start: 2021-03-01 | End: 2021-03-01 | Stop reason: SDUPTHER

## 2021-03-01 RX ORDER — DICYCLOMINE HYDROCHLORIDE 10 MG/1
10 CAPSULE ORAL
Qty: 80 CAPSULE | Refills: 1 | Status: SHIPPED | OUTPATIENT
Start: 2021-03-01

## 2021-03-01 RX ORDER — OMEPRAZOLE 20 MG/1
20 CAPSULE, DELAYED RELEASE ORAL DAILY
Qty: 30 CAPSULE | Refills: 3 | Status: SHIPPED | OUTPATIENT
Start: 2021-03-01

## 2021-03-01 RX ORDER — SELENIUM 50 MCG
1 TABLET ORAL DAILY
Qty: 30 CAPSULE | Refills: 3 | Status: SHIPPED | OUTPATIENT
Start: 2021-03-01

## 2021-03-01 RX ORDER — FAMOTIDINE 40 MG/1
40 TABLET, FILM COATED ORAL EVERY EVENING
Qty: 30 TABLET | Refills: 3 | Status: SHIPPED | OUTPATIENT
Start: 2021-03-01

## 2021-03-01 RX ORDER — FAMOTIDINE 40 MG/1
40 TABLET, FILM COATED ORAL EVERY EVENING
Qty: 30 TABLET | Refills: 3 | Status: SHIPPED | OUTPATIENT
Start: 2021-03-01 | End: 2021-03-01 | Stop reason: SDUPTHER

## 2021-03-01 NOTE — PROGRESS NOTES
Sierra Abraham 47 y.o. female was seen by Hilliard Carrel, NP-C on 03/01/21     Wt Readings from Last 3 Encounters:   03/01/21 272 lb 3.2 oz (123.5 kg)   12/19/19 276 lb (125.2 kg)   11/13/19 276 lb 9.6 oz (125.5 kg)       HPI Zuleyka Galarza is a pleasant 47 y.o.  female who presents today for follow-up on abdominal pain and CT of the abdomen/pelvis results. She reports feeling better with no current abdominal pain. Her CT of abdomen/pelvis done on 2- showed apparent mild circumferential wall thickening of the entire colon and rectum, likely due to incomplete distention, no adnexal mass and normal appendix. Her last EGD/colonoscopy were done by Dr. Chuy Clark on 1-. Her EGD revealed normal esophagus, irregular Z line, normal stomach and normal duodenum.  The biopsies of her esophagus revealed mild chronic inflammation.  No evidence of H. Pylori or Celiac Sprue. Her colonoscopy revealed a hyperplastic polyp that was removed from her ascending colon, otherwise normal colon.  Her colon biopsies were negative for microscopic colitis. Her last colonoscopy was done by Dr. Chuy Clark on 5- showing normal colon with biopsies showing no evidence of microscopic colitis. Her appetite is good and weight is stable. She is eating a regular diet. She has intermittent abdominal cramping that can be sharp at times if she eats certain foods like peanuts, seafood, greasy or fatty foods. No excess belching or flatulence. Her appetite is good and weight is stable. She did not receive her Prilosec from the pharmacy. She continues to have heartburn and acid reflux. No nocturnal awakenings with acid reflux. No dysphagia or pain with swallowing. No excess belching or flatulence. Her typical bowel pattern is two to three times daily with soft mushy brown stools. Her diarrhea has improved since our last visit. No constipation. No blood in her stools or black tarry stools.  No family history of stomach or colon cancer       ROS  Review of Systems   Constitutional: Negative for appetite change, chills, fatigue, fever and unexpected weight change.    HENT: Negative for ear pain, hearing loss and tinnitus.   Eyes: Positive for visual disturbance. Negative for pain. Respiratory: Positive for cough. Negative for shortness of breath and wheezing.    Cardiovascular: Positive for leg swelling. Negative for chest pain and palpitations. Gastrointestinal: Positive for abdominal pain and diarrhea. Negative for blood in stool, constipation, nausea and vomiting. Endocrine: Negative for cold intolerance and heat intolerance. Genitourinary: Negative for dysuria, frequency and urgency. Musculoskeletal: Positive for back pain. Negative for myalgias and neck pain. Skin: Negative for color change, pallor and rash. Allergic/Immunologic: Negative for environmental allergies and food allergies. Neurological: Positive for headaches. Negative for dizziness and seizures. Hematological: Does not bruise/bleed easily. Psychiatric/Behavioral: Negative for dysphoric mood, sleep disturbance and suicidal ideas.  The patient is not nervous/anxious.           Allergies  No Known Allergies    Medications  Current Outpatient Medications   Medication Sig Dispense Refill    famotidine (PEPCID) 40 MG tablet Take 1 tablet by mouth every evening 30 tablet 3    dicyclomine (BENTYL) 10 MG capsule Take 1 capsule by mouth 4 times daily (before meals and nightly) 80 capsule 1    omeprazole (PRILOSEC) 20 MG delayed release capsule Take 1 capsule by mouth daily (Patient not taking: Reported on 3/1/2021) 30 capsule 3    Lactobacillus (ACIDOPHILUS) CAPS capsule Take 1 capsule by mouth daily (Patient not taking: Reported on 3/1/2021) 30 capsule 3    levothyroxine (SYNTHROID) 50 MCG tablet Take 1 tablet by mouth daily (Patient not taking: Reported on 3/1/2021) 30 tablet 5    Probiotic Product (ALIGN) 4 MG CAPS Take 1 capsule by mouth daily (Patient not taking: Reported on 3/1/2021) 30 capsule 3  benzonatate (TESSALON) 200 MG capsule Take 1 capsule by mouth 3 times daily as needed for Cough (Patient not taking: Reported on 3/1/2021) 30 capsule 0    promethazine-dextromethorphan (PROMETHAZINE-DM) 6.25-15 MG/5ML syrup Take 5 mLs by mouth 4 times daily as needed for Cough (Patient not taking: Reported on 3/1/2021) 240 mL 0     No current facility-administered medications for this visit. Past medical history:   She has a past medical history of Facial injury, Gastroenteritis, and Thyroid disease. Past surgical history:  She has a past surgical history that includes Cholecystectomy; Dental surgery; Endoscopy, colon, diagnostic (01/30/2018); Colonoscopy (01/30/2018); Colonoscopy (05/22/2019); and Colonoscopy (N/A, 5/22/2019). Social History:  She reports that she has been smoking cigarettes. She has a 10.00 pack-year smoking history. She has never used smokeless tobacco. She reports current alcohol use. She reports current drug use. Drug: Marijuana. Family history:  Her family history includes Diabetes in her maternal grandmother and mother. Objective    Vitals:    03/01/21 1028   BP: (!) 140/90   Pulse: 83   Resp: 16   Temp: 98 °F (36.7 °C)   SpO2: 97%        Physical exam    Physical Exam  Vitals signs reviewed. Constitutional:       General: She is not in acute distress. Appearance: She is well-developed. She is obese. She is not ill-appearing, toxic-appearing or diaphoretic. HENT:      Head: Normocephalic and atraumatic. Nose: Nose normal.      Mouth/Throat:      Mouth: Mucous membranes are moist.   Eyes:      Conjunctiva/sclera: Conjunctivae normal.      Pupils: Pupils are equal, round, and reactive to light. Neck:      Musculoskeletal: Normal range of motion and neck supple. Thyroid: No thyromegaly. Vascular: No JVD. Trachea: No tracheal deviation. Cardiovascular:      Rate and Rhythm: Normal rate and regular rhythm. Pulses: Normal pulses. Heart sounds: Normal heart sounds. No murmur. No friction rub. No gallop. Pulmonary:      Effort: Pulmonary effort is normal. No respiratory distress. Breath sounds: Normal breath sounds. No stridor. No wheezing, rhonchi or rales. Chest:      Chest wall: No tenderness. Abdominal:      General: Bowel sounds are normal. There is no distension. Palpations: Abdomen is soft. There is no mass. Tenderness: There is abdominal tenderness. There is no guarding or rebound. Hernia: No hernia is present. Comments: Mild right lower quadrant discomfort 2/10   Musculoskeletal: Normal range of motion. Lymphadenopathy:      Cervical: No cervical adenopathy. Skin:     General: Skin is warm and dry. Neurological:      Mental Status: She is alert and oriented to person, place, and time.    Psychiatric:         Mood and Affect: Mood normal.         Hospital Outpatient Visit on 01/15/2021   Component Date Value Ref Range Status    WBC 01/15/2021 11.4* 4.0 - 10.5 K/CU MM Final    RBC 01/15/2021 5.09  4.2 - 5.4 M/CU MM Final    Hemoglobin 01/15/2021 15.2  12.5 - 16.0 GM/DL Final    Hematocrit 01/15/2021 47.0  37 - 47 % Final    MCV 01/15/2021 92.3  78 - 100 FL Final    MCH 01/15/2021 29.9  27 - 31 PG Final    MCHC 01/15/2021 32.3  32.0 - 36.0 % Final    RDW 01/15/2021 12.4  11.7 - 14.9 % Final    Platelets 49/73/4756 310  140 - 440 K/CU MM Final    MPV 01/15/2021 10.7  7.5 - 11.1 FL Final    Differential Type 01/15/2021 AUTOMATED DIFFERENTIAL   Final    Segs Relative 01/15/2021 61.7  36 - 66 % Final    Lymphocytes % 01/15/2021 31.0  24 - 44 % Final    Monocytes % 01/15/2021 5.1* 0 - 4 % Final    Eosinophils % 01/15/2021 1.7  0 - 3 % Final    Basophils % 01/15/2021 0.2  0 - 1 % Final    Segs Absolute 01/15/2021 7.1  K/CU MM Final    Lymphocytes Absolute 01/15/2021 3.5  K/CU MM Final    Monocytes Absolute 01/15/2021 0.6  K/CU MM Final  Eosinophils Absolute 01/15/2021 0.2  K/CU MM Final    Basophils Absolute 01/15/2021 0.0  K/CU MM Final    Nucleated RBC % 01/15/2021 0.0  % Final    Total Nucleated RBC 01/15/2021 0.0  K/CU MM Final    Total Immature Neutrophil 01/15/2021 0.03  K/CU MM Final    Immature Neutrophil % 01/15/2021 0.3  0 - 0.43 % Final    Sodium 01/15/2021 140  135 - 145 MMOL/L Final    Potassium 01/15/2021 4.4  3.5 - 5.1 MMOL/L Final    Chloride 01/15/2021 106  99 - 110 mMol/L Final    CO2 01/15/2021 25  21 - 32 MMOL/L Final    BUN 01/15/2021 11  6 - 23 MG/DL Final    CREATININE 01/15/2021 0.8  0.6 - 1.1 MG/DL Final    Glucose 01/15/2021 105* 70 - 99 MG/DL Final    Calcium 01/15/2021 8.8  8.3 - 10.6 MG/DL Final    Albumin 01/15/2021 4.0  3.4 - 5.0 GM/DL Final    Total Protein 01/15/2021 6.8  6.4 - 8.2 GM/DL Final    Total Bilirubin 01/15/2021 0.2  0.0 - 1.0 MG/DL Final    ALT 01/15/2021 23  10 - 40 U/L Final    AST 01/15/2021 23  15 - 37 IU/L Final    Alkaline Phosphatase 01/15/2021 73  40 - 128 IU/L Final    GFR Non- 01/15/2021 >60  >60 mL/min/1.73m2 Final    GFR  01/15/2021 >60  >60 mL/min/1.73m2 Final    Anion Gap 01/15/2021 9  4 - 16 Final       Assessment and Plan:     1.  Abdominal pain/discomfort to lower abdomen most likely due to irritable bowel syndrome or colitis. Her CT of abdomen/pelvis done on 2- showed apparent mild circumferential wall thickening of the entire colon and rectum, likely due to incomplete distention, no adnexal mass and normal appendix. She denies pain at this time. Her bowel pattern has improved. The patient was encouraged to avoid sugar/inflammatory foods and continue with low fat diet. The patient was encouraged to take Bentyl as needed. 2.  Loose stools with mix of diarrhea most likely due to diet, functional bowel disease such as irritable bowel syndrome, diet or medication related.  The patient was encouraged to complete her stool studies. The patient was encouraged to take Bentyl as needed for abdominal cramping and loose stools.  Discussed side effects of drowsiness, dry mouth and constipation.  The patient was encouraged to continue taking Probiotic. The patient was encouraged to avoid inflammatory foods and follow dietician recommendations.   Dr. 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 Per patient record she was told her condition is stable due to collateral circulation and she is currently asymptomatic   3. History of colitis with none noted on last colonoscopy. Her CT of abdomen/pelvis shows mild thickening would recommend monitoring this. She denies abdominal pain, diarrhea or blood in her stools at this time. The patient was encouraged to complete her stool studies. 4.  GERD that is stable without dysphagia or odynophagia. Her heartburn is worse during the day.  The patient was encouraged to restart taking Prilosec 20 mg take once daily in the morning and Pepcid 40 mg take daily in the evening.  The patient was encouraged to continue with anti-reflux measures, avoid eating late at night, avoid trigger foods and weight loss.   5.  The patient was encouraged to follow-up in 3 months.

## 2021-03-02 ENCOUNTER — HOSPITAL ENCOUNTER (OUTPATIENT)
Age: 41
Setting detail: SPECIMEN
Discharge: HOME | End: 2021-03-02
Payer: COMMERCIAL

## 2021-03-02 LAB
ADENOVIRUS F 40 41 PCR: NOT DETECTED
ASTROVIRUS PCR: NOT DETECTED
CAMPYLOBACTER PCR: NOT DETECTED
CRYPTOSPORIDIUM PCR: NOT DETECTED
CYCLOSPORA CAYETANENSIS PCR: NOT DETECTED
E COLI 0157 PCR: NOT DETECTED
E COLI ENTEROAGGREGATIVE PCR: NOT DETECTED
E COLI ENTEROPATHOGENIC PCR: NOT DETECTED
E COLI ENTEROTOXIGENIC PCR: NOT DETECTED
E COLI SHIGA LIKE TOXIN PCR: NOT DETECTED
E COLI SHIGELLA/ENTEROINVASIVE PCR: NOT DETECTED
ENTAMOEBA HISTOLYTICA PCR: NOT DETECTED
GIARDIA LAMBLIA PCR: NOT DETECTED
NOROVIRUS GI GII PCR: NOT DETECTED
PLESIOMONAS SHIGELLOIDES PCR: NOT DETECTED
ROTAVIRUS A PCR: NOT DETECTED
SALMONELLA PCR: NOT DETECTED
SAPOVIRUS PCR: NOT DETECTED
VIBRIO CHOLERAE PCR: NOT DETECTED
VIBRIO PCR: NOT DETECTED
YERSINIA ENTEROCOLITICA PCR: NOT DETECTED

## 2021-03-02 PROCEDURE — 87209 SMEAR COMPLEX STAIN: CPT

## 2021-03-02 PROCEDURE — 87324 CLOSTRIDIUM AG IA: CPT

## 2021-03-02 PROCEDURE — 87507 IADNA-DNA/RNA PROBE TQ 12-25: CPT

## 2021-03-02 PROCEDURE — 83630 LACTOFERRIN FECAL (QUAL): CPT

## 2021-03-02 PROCEDURE — 83993 ASSAY FOR CALPROTECTIN FECAL: CPT

## 2021-03-02 PROCEDURE — 87177 OVA AND PARASITES SMEARS: CPT

## 2021-03-02 PROCEDURE — 87329 GIARDIA AG IA: CPT

## 2021-03-03 LAB — LACTOFERRIN, QUAL: NEGATIVE

## 2021-03-05 LAB — CALPROTECTIN, FECAL: 6 UG/G

## 2021-03-08 LAB — INTERPRETATION: NEGATIVE

## 2021-03-19 ENCOUNTER — TELEPHONE (OUTPATIENT)
Dept: GASTROENTEROLOGY | Age: 41
End: 2021-03-19

## 2021-11-18 ENCOUNTER — OFFICE VISIT (OUTPATIENT)
Dept: FAMILY MEDICINE CLINIC | Age: 41
End: 2021-11-18
Payer: COMMERCIAL

## 2021-11-18 VITALS
WEIGHT: 275 LBS | DIASTOLIC BLOOD PRESSURE: 88 MMHG | HEART RATE: 82 BPM | OXYGEN SATURATION: 97 % | TEMPERATURE: 97.1 F | BODY MASS INDEX: 45.76 KG/M2 | SYSTOLIC BLOOD PRESSURE: 138 MMHG

## 2021-11-18 DIAGNOSIS — M72.2 PLANTAR FASCIITIS, RIGHT: ICD-10-CM

## 2021-11-18 DIAGNOSIS — N93.9 VAGINAL BLEEDING: ICD-10-CM

## 2021-11-18 DIAGNOSIS — M25.571 ACUTE RIGHT ANKLE PAIN: ICD-10-CM

## 2021-11-18 DIAGNOSIS — M70.61 TROCHANTERIC BURSITIS OF RIGHT HIP: Primary | ICD-10-CM

## 2021-11-18 PROCEDURE — G8484 FLU IMMUNIZE NO ADMIN: HCPCS | Performed by: FAMILY MEDICINE

## 2021-11-18 PROCEDURE — G8417 CALC BMI ABV UP PARAM F/U: HCPCS | Performed by: FAMILY MEDICINE

## 2021-11-18 PROCEDURE — 99214 OFFICE O/P EST MOD 30 MIN: CPT | Performed by: FAMILY MEDICINE

## 2021-11-18 PROCEDURE — 4004F PT TOBACCO SCREEN RCVD TLK: CPT | Performed by: FAMILY MEDICINE

## 2021-11-18 PROCEDURE — G8427 DOCREV CUR MEDS BY ELIG CLIN: HCPCS | Performed by: FAMILY MEDICINE

## 2021-11-18 RX ORDER — IBUPROFEN 800 MG/1
800 TABLET ORAL
Qty: 90 TABLET | Refills: 0 | Status: SHIPPED | OUTPATIENT
Start: 2021-11-18

## 2021-11-18 ASSESSMENT — PATIENT HEALTH QUESTIONNAIRE - PHQ9
SUM OF ALL RESPONSES TO PHQ QUESTIONS 1-9: 0
2. FEELING DOWN, DEPRESSED OR HOPELESS: 0
1. LITTLE INTEREST OR PLEASURE IN DOING THINGS: 0
SUM OF ALL RESPONSES TO PHQ QUESTIONS 1-9: 0
SUM OF ALL RESPONSES TO PHQ QUESTIONS 1-9: 0
SUM OF ALL RESPONSES TO PHQ9 QUESTIONS 1 & 2: 0

## 2021-11-18 NOTE — PATIENT INSTRUCTIONS
Patient Education        Hip Bursitis: Exercises  Introduction  Here are some examples of exercises for you to try. The exercises may be suggested for a condition or for rehabilitation. Start each exercise slowly. Ease off the exercises if you start to have pain. You will be told when to start these exercises and which ones will work best for you. How to do the exercises  Hip rotator stretch    1. Lie on your back with both knees bent and your feet flat on the floor. 2. Put the ankle of your affected leg on your opposite thigh near your knee. 3. Use your hand to gently push your knee away from your body until you feel a gentle stretch around your hip. 4. Hold the stretch for 15 to 30 seconds. 5. Repeat 2 to 4 times. 6. Repeat steps 1 through 5, but this time use your hand to gently pull your knee toward your opposite shoulder. Iliotibial band stretch    1. Lean sideways against a wall. If you are not steady on your feet, hold on to a chair or counter. 2. Stand on the leg with the affected hip, with that leg close to the wall. Then cross your other leg in front of it. 3. Let your affected hip drop out to the side of your body and against wall. Then lean away from your affected hip until you feel a stretch. 4. Hold the stretch for 15 to 30 seconds. 5. Repeat 2 to 4 times. Straight-leg raises to the outside    1. Lie on your side, with your affected hip on top. 2. Tighten the front thigh muscles of your top leg to keep your knee straight. 3. Keep your hip and your leg straight in line with the rest of your body, and keep your knee pointing forward. Do not drop your hip back. 4. Lift your top leg straight up toward the ceiling, about 12 inches off the floor. Hold for about 6 seconds, then slowly lower your leg. 5. Repeat 8 to 12 times. Clamshell    1. Lie on your side, with your affected hip on top and your head propped on a pillow. Keep your feet and knees together and your knees bent.   2. Raise Fasciitis: Exercises. \"     If you do not have an account, please click on the \"Sign Up Now\" link. Current as of: July 1, 2021               Content Version: 13.0  © 2006-2021 Healthwise, Incorporated. Care instructions adapted under license by [facility] ([facility]). If you have questions about a medical condition or this instruction, always ask your healthcare professional. [address] any warranty or liability for your use of this information. Patient Education        Ankle Sprain: Rehab Exercises  Introduction  Here are some examples of exercises for you to try. The exercises may be suggested for a condition or for rehabilitation. Start each exercise slowly. Ease off the exercises if you start to have pain. You will be told when to start these exercises and which ones will work best for you. How to do the exercises  'Alphabet' exercise    1. Trace the alphabet with your toe. This helps your ankle move in all directions. Side-to-side knee swing exercise    1. Sit in a chair with your foot flat on the floor. 2. Slowly move your knee from side to side. Keep your foot pressed flat. 3. Continue this exercise for 2 to 3 minutes. Towel curl    1. While sitting, place your foot on a towel on the floor. Scrunch the towel toward you with your toes. 2. Then use your toes to push the towel away from you. 3. To make this exercise more challenging you can put something on the other end of the towel. A can of soup is about the right weight for this. Towel stretch    1. Sit with your legs extended and knees straight. 2. Place a towel around your foot just under the toes. 3. Hold each end of the towel in each hand, with your hands above your knees. 4. Pull back with the towel so that your foot stretches toward you. 5. Hold the position for at least 15 to 30 seconds. 6. Repeat 2 to 4 times a session. Do up to 5 sessions a day. Ankle eversion exercise    1.  Start by sitting with your foot flat on the floor. Push your foot outward against a wall or a piece of furniture that doesn't move. Hold for about 6 seconds, and relax. Repeat 8 to 12 times. 2. After you feel comfortable with this, try using rubber tubing looped around the outside of your feet for resistance. Push your foot out to the side against the tubing, and then count to 10 as you slowly bring your foot back to the middle. Repeat 8 to 12 times. Isometric opposition exercises    1. While sitting, put your feet together flat on the floor. 2. Press your injured foot inward against your other foot. Hold for about 6 seconds, and relax. Repeat 8 to 12 times. 3. Then place the heel of your other foot on top of the injured one. Push down with the top heel while trying to push up with your injured foot. Hold for about 6 seconds, and relax. Repeat 8 to 12 times. Resisted ankle inversion    1. Sit on the floor with your good leg crossed over your other leg. 2. Hold both ends of an exercise band and loop the band around the inside of your affected foot. Then press your other foot against the band. 3. Keeping your legs crossed, slowly push your affected foot against the band so that foot moves away from your other foot. Then slowly relax. 4. Repeat 8 to 12 times. Resisted ankle eversion    1. Sit on the floor with your legs straight. 2. Hold both ends of an exercise band and loop the band around the outside of your affected foot. Then press your other foot against the band. 3. Keeping your leg straight, slowly push your affected foot outward against the band and away from your other foot without letting your leg rotate. Then slowly relax. 4. Repeat 8 to 12 times. Resisted ankle dorsiflexion    1. Tie the ends of an exercise band together to form a loop. Attach one end of the loop to a secure object or shut a door on it to hold it in place. (Or you can have someone hold one end of the loop to provide resistance.)  2.  While sitting on the floor or in a chair, loop the other end of the band over the top of your affected foot. 3. Keeping your knee and leg straight, slowly flex your foot to pull back on the exercise band, and then slowly relax. 4. Repeat 8 to 12 times. Single-leg balance    1. Stand on a flat surface with your arms stretched out to your sides like you are making the letter \"T. \" Then lift your good leg off the floor, bending it at the knee. If you are not steady on your feet, use one hand to hold on to a chair, counter, or wall. 2. Standing on the leg with your affected ankle, keep that knee straight. Try to balance on that leg for up to 30 seconds. Then rest for up to 10 seconds. 3. Repeat 6 to 8 times. 4. When you can balance on your affected leg for 30 seconds with your eyes open, try to balance on it with your eyes closed. 5. When you can do this exercise with your eyes closed for 30 seconds and with ease and no pain, try standing on a pillow or piece of foam, and repeat steps 1 through 4. Follow-up care is a key part of your treatment and safety. Be sure to make and go to all appointments, and call your doctor if you are having problems. It's also a good idea to know your test results and keep a list of the medicines you take. Where can you learn more? Go to [email or web address] and sign in to your [employer] account. Enter Desiree Gupta in the [facility] box to learn more about \"Ankle Sprain: Rehab Exercises. \"     If you do not have an account, please click on the \"Sign Up Now\" link. Current as of: July 1, 2021               Content Version: 13.0  © 2869-0373 Healthwise, Incorporated. Care instructions adapted under license by [facility] ([facility]). If you have questions about a medical condition or this instruction, always ask your healthcare professional. [address] any warranty or liability for your use of this information.

## 2021-11-18 NOTE — PROGRESS NOTES
Right leg and hip pain for 1 month. Had occurred 2 years ago and resolved. Has been standing more recently. Denies know trauma. Pain also in foot and ankle. Some numbness with lateral hip. Has been using aleve with is minimally effective, but it caused vaginal bleeding. Pain currently 6/10, increases to 9/10 in ankle and foot. Foot ad ankle pain worsen after sittin then standing. O:   Vitals:    11/18/21 1140   BP: 138/88   Pulse: 82   Temp: 97.1 °F (36.2 °C)   SpO2: 97%     No acute distress. Alert and Oriented x 3, obese  HEENT: Atraumatic. Normocephalic. PERRLA, EOMI, Conjunctiva clear  Oropharynx clear, mucosa moist.  Nasal mucosa normal  NECK: without thyromegaly, lymphadenopathy, JVD  LUNGS:Clear to ascultation bilaterally. Breathing comfortably  CARDIOVASCULAR:  Regular rate and rhythm, no murmurs, rubs, or gallops    EXTREMITY: Full range of motion. No clubbing/cyanosis/edema  Right hip with full range of motion. Tenderness over greater trochanter. Increased pain with cross adduction. Right foot with tenderness at plantar heel  NEURO: Cranial nerves II-XII grossly intact. Strength 5/5, DTR 2/4. SKIN: Warm, Dry, No rash. PSYCH: Mood and Affect normal.    A:    Diagnosis Orders   1. Trochanteric bursitis of right hip  ibuprofen (ADVIL;MOTRIN) 800 MG tablet   2. Plantar fasciitis, right  ibuprofen (ADVIL;MOTRIN) 800 MG tablet   3. Acute right ankle pain     4. Vaginal bleeding       P: Trial of ibuprofen for trochanteric bursitis and plantar fasciitis. Exercises given for trochanteric bursitis, plantar fasciitis, and right ankle pain. Patient should make an appointment with GYN for a gynecological evaluation.

## 2024-03-15 ENCOUNTER — HOSPITAL ENCOUNTER (EMERGENCY)
Age: 44
Discharge: HOME | End: 2024-03-15
Payer: COMMERCIAL

## 2024-03-15 VITALS
WEIGHT: 210 LBS | DIASTOLIC BLOOD PRESSURE: 95 MMHG | HEART RATE: 95 BPM | RESPIRATION RATE: 17 BRPM | BODY MASS INDEX: 34.99 KG/M2 | SYSTOLIC BLOOD PRESSURE: 168 MMHG | OXYGEN SATURATION: 97 % | HEIGHT: 65 IN | TEMPERATURE: 98 F

## 2024-03-15 DIAGNOSIS — G89.29 ACUTE EXACERBATION OF CHRONIC LOW BACK PAIN: Primary | ICD-10-CM

## 2024-03-15 DIAGNOSIS — M54.50 ACUTE EXACERBATION OF CHRONIC LOW BACK PAIN: Primary | ICD-10-CM

## 2024-03-15 DIAGNOSIS — E86.0 DEHYDRATION: ICD-10-CM

## 2024-03-15 LAB
BILIRUBIN URINE: ABNORMAL MG/DL
BLOOD, URINE: NEGATIVE
CLARITY: CLEAR
COLOR: YELLOW
COMMENT UA: ABNORMAL
GLUCOSE, URINE: NEGATIVE MG/DL
KETONES, URINE: >80 MG/DL
LEUKOCYTE ESTERASE, URINE: NEGATIVE
NITRITE URINE, QUANTITATIVE: NEGATIVE
PH, URINE: 5.5 (ref 5–8)
PROTEIN UA: NEGATIVE MG/DL
SPECIFIC GRAVITY UA: >1.03 (ref 1–1.03)
UROBILINOGEN, URINE: 0.2 MG/DL (ref 0.2–1)

## 2024-03-15 PROCEDURE — 6370000000 HC RX 637 (ALT 250 FOR IP): Performed by: NURSE PRACTITIONER

## 2024-03-15 PROCEDURE — 96372 THER/PROPH/DIAG INJ SC/IM: CPT

## 2024-03-15 PROCEDURE — 99284 EMERGENCY DEPT VISIT MOD MDM: CPT

## 2024-03-15 PROCEDURE — 6360000002 HC RX W HCPCS: Performed by: NURSE PRACTITIONER

## 2024-03-15 PROCEDURE — 81003 URINALYSIS AUTO W/O SCOPE: CPT

## 2024-03-15 RX ORDER — DEXAMETHASONE SODIUM PHOSPHATE 10 MG/ML
10 INJECTION, SOLUTION INTRAMUSCULAR; INTRAVENOUS ONCE
Status: COMPLETED | OUTPATIENT
Start: 2024-03-15 | End: 2024-03-15

## 2024-03-15 RX ORDER — KETOROLAC TROMETHAMINE 15 MG/ML
30 INJECTION, SOLUTION INTRAMUSCULAR; INTRAVENOUS ONCE
Status: COMPLETED | OUTPATIENT
Start: 2024-03-15 | End: 2024-03-15

## 2024-03-15 RX ORDER — METHOCARBAMOL 500 MG/1
750 TABLET, FILM COATED ORAL ONCE
Status: COMPLETED | OUTPATIENT
Start: 2024-03-15 | End: 2024-03-15

## 2024-03-15 RX ORDER — CYCLOBENZAPRINE HCL 10 MG
10 TABLET ORAL 3 TIMES DAILY PRN
Qty: 21 TABLET | Refills: 0 | Status: SHIPPED | OUTPATIENT
Start: 2024-03-15 | End: 2024-03-25

## 2024-03-15 RX ORDER — NAPROXEN 500 MG/1
500 TABLET ORAL 2 TIMES DAILY WITH MEALS
Qty: 28 TABLET | Refills: 0 | Status: SHIPPED | OUTPATIENT
Start: 2024-03-15 | End: 2024-03-29

## 2024-03-15 RX ADMIN — METHOCARBAMOL 750 MG: 500 TABLET ORAL at 14:21

## 2024-03-15 RX ADMIN — KETOROLAC TROMETHAMINE 30 MG: 15 INJECTION, SOLUTION INTRAMUSCULAR; INTRAVENOUS at 14:20

## 2024-03-15 RX ADMIN — DEXAMETHASONE SODIUM PHOSPHATE 10 MG: 10 INJECTION, SOLUTION INTRAMUSCULAR; INTRAVENOUS at 14:20

## 2024-03-15 ASSESSMENT — PAIN SCALES - GENERAL
PAINLEVEL_OUTOF10: 10
PAINLEVEL_OUTOF10: 10

## 2024-03-15 ASSESSMENT — PAIN DESCRIPTION - LOCATION
LOCATION: BACK
LOCATION: BACK;LEG

## 2024-03-15 ASSESSMENT — PAIN DESCRIPTION - FREQUENCY: FREQUENCY: CONTINUOUS

## 2024-03-15 ASSESSMENT — PAIN DESCRIPTION - DESCRIPTORS: DESCRIPTORS: SHARP

## 2024-03-15 ASSESSMENT — PAIN DESCRIPTION - ORIENTATION: ORIENTATION: RIGHT

## 2024-03-15 NOTE — ED TRIAGE NOTES
Pt arrives with complaint of back pain radiating down R leg, pt states the pain \"takes her breath away\"

## 2024-03-15 NOTE — ED PROVIDER NOTES
[facility] EMERGENCY DEPARTMENT  EMERGENCY DEPARTMENT ENCOUNTER        Pt Name: Yane Abraham  MRN: [id number]  Birthdate 1980  Date of evaluation: 3/15/2024  Provider: SOPHIE MARIN - CNP  PCP: Jose D Robles MD    KIMBERLY. I have evaluated this patient.        Triage CHIEF COMPLAINT       Chief Complaint   Patient presents with    Back Pain         HISTORY OF PRESENT ILLNESS      Chief Complaint: back pain    Yane Abraham is a 43 y.o. female who presents for evaluation of back pain.  She states this started after she was sick 3 days ago with N/V/D.  She started noticing increased discomfort and yesterday started having spasms.  Today it is worse and she is having trouble moving.  Radicular pain down right leg.  Denies urinary or bowel changes including retention or incontinence , LE weakness, numbness/paresthesias, F/C.  Has had a couple of episodes of urinating on self but confirms this is because she cannot walk to the bathroom fast enough due to pain.  She does have some intermittent right lower abdominal pain.  Has history of back pain in the past, this feels similar but more intense than usual.        Nursing Notes were all reviewed and agreed with or any disagreements were addressed in the HPI.    REVIEW OF SYSTEMS     Pertinent ROS as noted in HPI.      PAST MEDICAL HISTORY     Past Medical History:   Diagnosis Date    Facial injury 06/01/2014    Gastroenteritis     Thyroid disease        SURGICAL HISTORY     Past Surgical History:   Procedure Laterality Date    CHOLECYSTECTOMY      COLONOSCOPY  01/30/2018    single 3 mm sessile polypin ascending colon, multiple cold forceps random biopsies taken from the whole colon    COLONOSCOPY  05/22/2019    stat ct scan today, biopsy for colitis    COLONOSCOPY N/A 5/22/2019    COLONOSCOPY POLYPECTOMY SNARE/COLD BIOPSY performed by Quincy Magana MD at [facility] OR    DENTAL SURGERY      ENDOSCOPY,

## 2024-10-07 ENCOUNTER — HOSPITAL ENCOUNTER (EMERGENCY)
Age: 44
Discharge: HOME | End: 2024-10-07
Attending: EMERGENCY MEDICINE

## 2024-10-07 VITALS
TEMPERATURE: 98.3 F | HEIGHT: 65 IN | HEART RATE: 86 BPM | DIASTOLIC BLOOD PRESSURE: 95 MMHG | OXYGEN SATURATION: 96 % | SYSTOLIC BLOOD PRESSURE: 153 MMHG | BODY MASS INDEX: 44.98 KG/M2 | WEIGHT: 270 LBS | RESPIRATION RATE: 20 BRPM
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
## 2024-10-07 PROCEDURE — 99284 EMERGENCY DEPT VISIT MOD MDM: CPT

## 2024-10-07 PROCEDURE — 90375 RABIES IG IM/SC: CPT | Performed by: EMERGENCY MEDICINE

## 2024-10-07 PROCEDURE — 90675 RABIES VACCINE IM: CPT | Performed by: EMERGENCY MEDICINE

## 2024-10-07 PROCEDURE — 6360000002 HC RX W HCPCS: Performed by: EMERGENCY MEDICINE

## 2024-10-07 PROCEDURE — 90715 TDAP VACCINE 7 YRS/> IM: CPT | Performed by: EMERGENCY MEDICINE

## 2024-10-07 PROCEDURE — 90471 IMMUNIZATION ADMIN: CPT | Performed by: EMERGENCY MEDICINE

## 2024-10-07 PROCEDURE — 96372 THER/PROPH/DIAG INJ SC/IM: CPT

## 2024-10-07 PROCEDURE — 90472 IMMUNIZATION ADMIN EACH ADD: CPT | Performed by: EMERGENCY MEDICINE

## 2024-10-07 PROCEDURE — 6370000000 HC RX 637 (ALT 250 FOR IP): Performed by: EMERGENCY MEDICINE

## 2024-10-07 RX ORDER — GINSENG 100 MG
CAPSULE ORAL ONCE
Status: COMPLETED | OUTPATIENT
Start: 2024-10-07 | End: 2024-10-07

## 2024-10-07 RX ORDER — ONDANSETRON 4 MG/1
4 TABLET, ORALLY DISINTEGRATING ORAL 3 TIMES DAILY PRN
Qty: 21 TABLET | Refills: 0 | Status: SHIPPED | OUTPATIENT
Start: 2024-10-07

## 2024-10-07 RX ADMIN — RABIES VACCINE 1 ML: KIT at 16:08

## 2024-10-07 RX ADMIN — TETANUS TOXOID, REDUCED DIPHTHERIA TOXOID AND ACELLULAR PERTUSSIS VACCINE, ADSORBED 0.5 ML: 5; 2.5; 8; 8; 2.5 SUSPENSION INTRAMUSCULAR at 15:10

## 2024-10-07 RX ADMIN — LIDOCAINE HYDROCHLORIDE: 20 SOLUTION ORAL at 16:08

## 2024-10-07 RX ADMIN — RABIES IMMUNE GLOBULIN (HUMAN) 2445 UNITS: 300 INJECTION, SOLUTION INFILTRATION; INTRAMUSCULAR at 16:12

## 2024-10-07 RX ADMIN — AMOXICILLIN AND CLAVULANATE POTASSIUM 1 TABLET: 875; 125 TABLET, FILM COATED ORAL at 15:11

## 2024-10-07 RX ADMIN — BACITRACIN 1 EACH: 500 OINTMENT TOPICAL at 16:08

## 2024-10-07 ASSESSMENT — PAIN DESCRIPTION - LOCATION: LOCATION: LEG

## 2024-10-07 ASSESSMENT — PAIN DESCRIPTION - ORIENTATION: ORIENTATION: RIGHT;LOWER

## 2024-10-07 ASSESSMENT — PAIN SCALES - GENERAL: PAINLEVEL_OUTOF10: 7

## 2024-10-07 NOTE — ED PROVIDER NOTES
tablet 0    Lactobacillus (ACIDOPHILUS) CAPS capsule Take 1 capsule by mouth daily (Patient not taking: Reported on 10/7/2024) 30 capsule 3    dicyclomine (BENTYL) 10 MG capsule Take 1 capsule by mouth 4 times daily (before meals and nightly) (Patient not taking: Reported on 10/7/2024) 80 capsule 1    levothyroxine (SYNTHROID) 50 MCG tablet Take 1 tablet by mouth daily (Patient not taking: Reported on 3/1/2021) 30 tablet 5    Probiotic Product (ALIGN) 4 MG CAPS Take 1 capsule by mouth daily (Patient not taking: Reported on 3/1/2021) 30 capsule 3    benzonatate (TESSALON) 200 MG capsule Take 1 capsule by mouth 3 times daily as needed for Cough (Patient not taking: Reported on 3/1/2021) 30 capsule 0    promethazine-dextromethorphan (PROMETHAZINE-DM) 6.25-15 MG/5ML syrup Take 5 mLs by mouth 4 times daily as needed for Cough (Patient not taking: Reported on 3/1/2021) 240 mL 0     No Known Allergies    Nursing Notes Reviewed    Physical Exam:  Triage VS:    ED Triage Vitals   Encounter Vitals Group      BP 10/07/24 1442 (!) 153/95      Systolic BP Percentile --       Diastolic BP Percentile --       Pulse 10/07/24 1442 86      Respirations 10/07/24 1442 20      Temp 10/07/24 1442 98.3 °F (36.8 °C)      Temp Source 10/07/24 1442 Oral      SpO2 10/07/24 1442 96 %      Weight - Scale 10/07/24 1442 122.5 kg (270 lb)      Height 10/07/24 1442 1.651 m (5' 5\")      Head Circumference --       Peak Flow --       Pain Score --       Pain Loc --       Pain Education --       Exclude from Growth Chart --        My pulse ox interpretation is - normal    General appearance:  Mild acute distress.   Skin:  Warm. Dry.   Eye:  Extraocular movements intact.     Ears, nose, mouth and throat:  Oral mucosa moist   Neck:  Trachea midline.   Extremity:  dog bite to right leg, Normal ROM     Heart:  Regular rate and rhythm, normal S1 & S2, no extra heart sounds.    Perfusion:  intact  Respiratory:  Lungs clear to auscultation bilaterally.

## 2024-10-07 NOTE — ED TRIAGE NOTES
Pt states she was bitten by a dog on Friday, pt concerned b/c she did not know if she has a tetanus shot, started having a panic attack and numbness in her thigh. Pt was bit in the back of her right calf

## 2024-10-10 ENCOUNTER — HOSPITAL ENCOUNTER (EMERGENCY)
Age: 44
Discharge: HOME | End: 2024-10-10
Payer: COMMERCIAL

## 2024-10-10 VITALS
OXYGEN SATURATION: 100 % | RESPIRATION RATE: 18 BRPM | SYSTOLIC BLOOD PRESSURE: 160 MMHG | DIASTOLIC BLOOD PRESSURE: 99 MMHG | TEMPERATURE: 97.9 F | HEART RATE: 88 BPM

## 2024-10-10 DIAGNOSIS — Z23 NEED FOR RABIES VACCINATION: ICD-10-CM

## 2024-10-10 DIAGNOSIS — Z51.89 VISIT FOR WOUND CHECK: Primary | ICD-10-CM

## 2024-10-10 PROCEDURE — 6360000002 HC RX W HCPCS: Performed by: PHYSICIAN ASSISTANT

## 2024-10-10 PROCEDURE — 90675 RABIES VACCINE IM: CPT | Performed by: PHYSICIAN ASSISTANT

## 2024-10-10 PROCEDURE — 96372 THER/PROPH/DIAG INJ SC/IM: CPT

## 2024-10-10 PROCEDURE — 99284 EMERGENCY DEPT VISIT MOD MDM: CPT

## 2024-10-10 PROCEDURE — 90471 IMMUNIZATION ADMIN: CPT | Performed by: PHYSICIAN ASSISTANT

## 2024-10-10 RX ADMIN — RABIES VACCINE 1 ML: KIT at 17:47

## 2024-10-10 ASSESSMENT — PAIN - FUNCTIONAL ASSESSMENT: PAIN_FUNCTIONAL_ASSESSMENT: NONE - DENIES PAIN

## 2024-10-10 NOTE — ED PROVIDER NOTES
Average packs/day: 0.5 packs/day for 20.0 years (10.0 ttl pk-yrs)     Types: Cigarettes    Smokeless tobacco: Never   Vaping Use    Vaping status: Never Used   Substance Use Topics    Alcohol use: Yes     Alcohol/week: 0.0 standard drinks of alcohol     Comment: occ    Drug use: Yes     Types: Marijuana (Weed)     Comment: used last about a week ago as of 4/18/19       SCREENINGS        Alvin Coma Scale  Eye Opening: Spontaneous  Best Verbal Response: Oriented  Best Motor Response: Obeys commands  Alvin Coma Scale Score: 15                CIWA Assessment  Pulse: 88           PHYSICAL EXAM  1 or more Elements     ED Triage Vitals [10/10/24 1718]   BP Systolic BP Percentile Diastolic BP Percentile Temp Temp src Pulse Respirations SpO2   -- -- -- 97.9 °F (36.6 °C) -- 88 18 100 %      Height Weight         -- --             Physical Exam  Vitals and nursing note reviewed.   Constitutional:       General: She is not in acute distress.     Appearance: Normal appearance. She is obese. She is not ill-appearing or toxic-appearing.   HENT:      Head: Normocephalic and atraumatic.      Right Ear: External ear normal.      Left Ear: External ear normal.   Eyes:      Conjunctiva/sclera: Conjunctivae normal.   Cardiovascular:      Rate and Rhythm: Normal rate.      Pulses: Normal pulses.   Pulmonary:      Effort: Pulmonary effort is normal.   Musculoskeletal:         General: Normal range of motion.      Cervical back: Normal range of motion.      Comments: Right posterior lower leg with 2 puncture wounds over the calf muscle, some generalized mild ecchymosis without erythema, purulence, warmth.   Skin:     General: Skin is warm and dry.   Neurological:      Mental Status: She is alert and oriented to person, place, and time.      Motor: No weakness.      Gait: Gait normal.   Psychiatric:         Mood and Affect: Mood normal.         Behavior: Behavior normal.             DIAGNOSTIC RESULTS   LABS:    Labs Reviewed - No data

## 2024-10-16 ENCOUNTER — HOSPITAL ENCOUNTER (EMERGENCY)
Age: 44
Discharge: HOME | End: 2024-10-16
Payer: COMMERCIAL

## 2024-10-16 VITALS
DIASTOLIC BLOOD PRESSURE: 100 MMHG | TEMPERATURE: 97.8 F | RESPIRATION RATE: 19 BRPM | HEART RATE: 84 BPM | OXYGEN SATURATION: 98 % | SYSTOLIC BLOOD PRESSURE: 159 MMHG

## 2024-10-16 DIAGNOSIS — Z23 ENCOUNTER FOR REPEAT ADMINISTRATION OF RABIES VACCINATION: Primary | ICD-10-CM

## 2024-10-16 PROCEDURE — 90471 IMMUNIZATION ADMIN: CPT | Performed by: NURSE PRACTITIONER

## 2024-10-16 PROCEDURE — 99284 EMERGENCY DEPT VISIT MOD MDM: CPT

## 2024-10-16 PROCEDURE — 90675 RABIES VACCINE IM: CPT | Performed by: NURSE PRACTITIONER

## 2024-10-16 PROCEDURE — 6360000002 HC RX W HCPCS: Performed by: NURSE PRACTITIONER

## 2024-10-16 RX ADMIN — RABIES VACCINE 1 ML: KIT at 19:50

## 2024-10-16 ASSESSMENT — PAIN - FUNCTIONAL ASSESSMENT
PAIN_FUNCTIONAL_ASSESSMENT: 0-10
PAIN_FUNCTIONAL_ASSESSMENT: NONE - DENIES PAIN

## 2024-10-16 ASSESSMENT — PAIN SCALES - GENERAL: PAINLEVEL_OUTOF10: 0

## 2024-10-17 NOTE — PROGRESS NOTES
Outpatient Pharmacy Progress Note    The patient's insurance does not allow Rabies Vaccination at a pharmacy. Patient must go to an \"office\" setting such as a clinic, health department or infusion center. Patient must call to see availability.     Options:   [facility]:  Phone: [phone]  Website: [email or web address]  Address: [address], [place] [address]  Hours: Monday through Friday from 8:00 am to 5:00 pm  Instructions:  Immunizations by appointment.  Call [phone] to schedule.  Please bring photo ID, insurance card(s), immunization record and ED paperwork.  [facility] does not accept all insurances and cannot bill Worker's Comp.  Payment is due at time of service.   For insurances that are billed, if the medical deductible has not been met, the patient will be billed for services rendered.      [facility] - Immunization Clinic  Phone: [phone]  Website: [email or web address]  Hours: Monday through Friday from 8:00 am to 4:30 pm  NOTE: The clinic is closed daily from 11:30am-12:30pm and the second and fourth Friday of every month from 8:00am-9:00am.  Instructions:  You must call to make appointment for each injection.  Bring the ED order for the follow-up vaccine to the first appointment at the health department.  Bring your discharge instructions from the ED visit to show proof of original vaccine & immunoglobulin.    [facility]  Phone: [phone]

## 2024-10-18 NOTE — ED PROVIDER NOTES
CONTINUE these medications which have NOT CHANGED    Details   amoxicillin-clavulanate (AUGMENTIN) 875-125 MG per tablet Take 1 tablet by mouth 2 times daily for 10 days, Disp-20 tablet, R-0Normal      omeprazole (PRILOSEC) 20 MG delayed release capsule Take 1 capsule by mouth daily, Disp-30 capsule, R-3Normal      ondansetron (ZOFRAN-ODT) 4 MG disintegrating tablet Take 1 tablet by mouth 3 times daily as needed for Nausea or Vomiting, Disp-21 tablet, R-0Normal      naproxen (NAPROSYN) 500 MG tablet Take 1 tablet by mouth 2 times daily (with meals) for 14 days, Disp-28 tablet, R-0Normal      ibuprofen (ADVIL;MOTRIN) 800 MG tablet Take 1 tablet by mouth 3 times daily (with meals), Disp-90 tablet, R-0Normal      Lactobacillus (ACIDOPHILUS) CAPS capsule Take 1 capsule by mouth daily, Disp-30 capsule, R-3Normal      dicyclomine (BENTYL) 10 MG capsule Take 1 capsule by mouth 4 times daily (before meals and nightly), Disp-80 capsule, R-1Normal      levothyroxine (SYNTHROID) 50 MCG tablet Take 1 tablet by mouth daily, Disp-30 tablet, R-5Normal      Probiotic Product (ALIGN) 4 MG CAPS Take 1 capsule by mouth daily, Disp-30 capsule, R-3Normal      benzonatate (TESSALON) 200 MG capsule Take 1 capsule by mouth 3 times daily as needed for Cough, Disp-30 capsule, R-0Normal      promethazine-dextromethorphan (PROMETHAZINE-DM) 6.25-15 MG/5ML syrup Take 5 mLs by mouth 4 times daily as needed for Cough, Disp-240 mL, R-0Normal             ALLERGIES     Patient has no known allergies.    FAMILYHISTORY       Family History   Problem Relation Age of Onset    Diabetes Mother     Diabetes Maternal Grandmother         SOCIAL HISTORY       Social History     Socioeconomic History    Marital status: Single     Spouse name: None    Number of children: None    Years of education: None    Highest education level: None   Occupational History    Occupation:    Tobacco Use    Smoking status: Every Day     Current packs/day: 0.50      IMPRESSION      1. Encounter for repeat administration of rabies vaccination          DISPOSITION/PLAN   DISPOSITION Decision To Discharge 10/16/2024 07:56:39 PM  Condition at Disposition: Data Unavailable      PATIENT REFERREDTO:  Jose D Robles MD  [address]  [facility] [address]  [phone]      2-3 days      DISCHARGE MEDICATIONS:  Discharge Medication List as of 10/16/2024  8:11 PM          DISCONTINUED MEDICATIONS:  Discharge Medication List as of 10/16/2024  8:11 PM                 (Please note that portions ofthis note were completed with a voice recognition program.  Efforts were made to edit the dictations but occasionally words are mis-transcribed.)    SOPHIE MARIN - CNP (electronically signed)              Georgette Vazquez APRN - CNP  10/18/24 9417

## 2024-10-22 ENCOUNTER — HOSPITAL ENCOUNTER (OUTPATIENT)
Dept: INFUSION THERAPY | Age: 44
Setting detail: INFUSION SERIES
Discharge: HOME | End: 2024-10-22
Payer: COMMERCIAL

## 2024-10-22 VITALS
HEART RATE: 64 BPM | RESPIRATION RATE: 18 BRPM | OXYGEN SATURATION: 100 % | DIASTOLIC BLOOD PRESSURE: 84 MMHG | TEMPERATURE: 98.1 F | SYSTOLIC BLOOD PRESSURE: 142 MMHG

## 2024-10-22 PROCEDURE — 90471 IMMUNIZATION ADMIN: CPT | Performed by: NURSE PRACTITIONER

## 2024-10-22 PROCEDURE — 96372 THER/PROPH/DIAG INJ SC/IM: CPT

## 2024-10-22 PROCEDURE — 6360000002 HC RX W HCPCS: Performed by: NURSE PRACTITIONER

## 2024-10-22 PROCEDURE — 90675 RABIES VACCINE IM: CPT | Performed by: NURSE PRACTITIONER

## 2024-10-22 RX ADMIN — RABIES VACCINE 1 ML: KIT at 13:48

## 2024-10-22 NOTE — PROGRESS NOTES
Pt taken to room 03 for rabies vaccine. Pt oriented to room, call light, bed/chair controls, TV, pt voiced understanding.  Plan of care explained to pt, pt voiced understanding.

## (undated) DEVICE — TUBING, SUCTION, 3/16" X 6', STRAIGHT: Brand: MEDLINE

## (undated) DEVICE — Z INACTIVE CONVERTED USE 2418596 CONTAINER SPEC 40ML 10% NEUT BUFF FRMLN CLR POLYPR PREFIL

## (undated) DEVICE — KENDALL 500 SERIES DIAPHORETIC FOAM MONITORING ELECTRODE - TEAR DROP SHAPE ( 30/PK): Brand: KENDALL

## (undated) DEVICE — JELLY LUBRICATING 3 GM BACTERIOSTATIC

## (undated) DEVICE — FORCEPS BX 240CM JAW 3.2MM L CAP NDL MIC MESH TTH M00513372

## (undated) DEVICE — LINER SUCT CANSTR 1500CC SEMI RIG W/ POR HYDROPHOBIC SHUT

## (undated) DEVICE — BW-412T DISP COMBO CLEANING BRUSH: Brand: SINGLE USE COMBINATION CLEANING BRUSH

## (undated) DEVICE — DEFENDO AIR WATER SUCTION AND BIOPSY VALVE KIT FOR  OLYMPUS: Brand: DEFENDO AIR/WATER/SUCTION AND BIOPSY VALVE

## (undated) DEVICE — LINE SAMP O2 6.5FT W/FEMALE CONN F/ADULT CAPNOLINE PLUS

## (undated) DEVICE — ACUSNARE POLYPECTOMY SNARE: Brand: ACUSNARE